# Patient Record
Sex: FEMALE | Race: ASIAN | Employment: UNEMPLOYED | URBAN - METROPOLITAN AREA
[De-identification: names, ages, dates, MRNs, and addresses within clinical notes are randomized per-mention and may not be internally consistent; named-entity substitution may affect disease eponyms.]

---

## 2023-10-30 ENCOUNTER — TELEPHONE (OUTPATIENT)
Dept: OBGYN CLINIC | Facility: CLINIC | Age: 28
End: 2023-10-30

## 2023-10-30 NOTE — TELEPHONE ENCOUNTER
Looking for new patient missed menses appointment, only wants to see Cheli Hartmann.  Unsure of LMP, but states 8 weeks PG

## 2023-10-31 NOTE — TELEPHONE ENCOUNTER
Pt called to clarify if she is wanting to see Tor Hercules through [facility] or Qamar Rosado through [address] wants future ultrasound and new ob visit with Tor Hercules canceled, and see Qamar Rosado for her missed menses.

## 2023-11-02 ENCOUNTER — INITIAL PRENATAL (OUTPATIENT)
Dept: OBGYN CLINIC | Facility: CLINIC | Age: 28
End: 2023-11-02

## 2023-11-02 ENCOUNTER — LAB ENCOUNTER (OUTPATIENT)
Dept: LAB | Age: 28
End: 2023-11-02
Attending: OBSTETRICS & GYNECOLOGY
Payer: MEDICAID

## 2023-11-02 VITALS — HEIGHT: 62.99 IN | BODY MASS INDEX: 21.38 KG/M2 | WEIGHT: 120.69 LBS

## 2023-11-02 DIAGNOSIS — Z11.3 SCREEN FOR STD (SEXUALLY TRANSMITTED DISEASE): ICD-10-CM

## 2023-11-02 DIAGNOSIS — Z34.01 ENCOUNTER FOR SUPERVISION OF NORMAL FIRST PREGNANCY IN FIRST TRIMESTER: ICD-10-CM

## 2023-11-02 DIAGNOSIS — N76.0 VAGINITIS AND VULVOVAGINITIS: ICD-10-CM

## 2023-11-02 DIAGNOSIS — Z12.4 ENCOUNTER FOR PAPANICOLAOU SMEAR FOR CERVICAL CANCER SCREENING: ICD-10-CM

## 2023-11-02 DIAGNOSIS — Z34.01 ENCOUNTER FOR SUPERVISION OF NORMAL FIRST PREGNANCY IN FIRST TRIMESTER: Primary | ICD-10-CM

## 2023-11-02 LAB
ANTIBODY SCREEN: NEGATIVE
BASOPHILS # BLD AUTO: 0.01 X10(3) UL (ref 0–0.2)
BASOPHILS NFR BLD AUTO: 0.2 %
DEPRECATED HBV CORE AB SER IA-ACNC: 70.4 NG/ML
EOSINOPHIL # BLD AUTO: 0.05 X10(3) UL (ref 0–0.7)
EOSINOPHIL NFR BLD AUTO: 1 %
ERYTHROCYTE [DISTWIDTH] IN BLOOD BY AUTOMATED COUNT: 13.9 %
HCT VFR BLD AUTO: 37 %
HGB BLD-MCNC: 11.7 G/DL
IMM GRANULOCYTES # BLD AUTO: 0.01 X10(3) UL (ref 0–1)
IMM GRANULOCYTES NFR BLD: 0.2 %
LYMPHOCYTES # BLD AUTO: 1.26 X10(3) UL (ref 1–4)
LYMPHOCYTES NFR BLD AUTO: 24.1 %
MCH RBC QN AUTO: 26.7 PG (ref 26–34)
MCHC RBC AUTO-ENTMCNC: 31.6 G/DL (ref 31–37)
MCV RBC AUTO: 84.3 FL
MONOCYTES # BLD AUTO: 0.39 X10(3) UL (ref 0.1–1)
MONOCYTES NFR BLD AUTO: 7.5 %
NEUTROPHILS # BLD AUTO: 3.51 X10 (3) UL (ref 1.5–7.7)
NEUTROPHILS # BLD AUTO: 3.51 X10(3) UL (ref 1.5–7.7)
NEUTROPHILS NFR BLD AUTO: 67 %
PLATELET # BLD AUTO: 183 10(3)UL (ref 150–450)
RBC # BLD AUTO: 4.39 X10(6)UL
RH BLOOD TYPE: POSITIVE
RUBV IGG SER QL: NEGATIVE
RUBV IGG SER-ACNC: 0.6 IU/ML (ref 10–?)
WBC # BLD AUTO: 5.2 X10(3) UL (ref 4–11)

## 2023-11-02 PROCEDURE — 87389 HIV-1 AG W/HIV-1&-2 AB AG IA: CPT

## 2023-11-02 PROCEDURE — 86850 RBC ANTIBODY SCREEN: CPT

## 2023-11-02 PROCEDURE — 86900 BLOOD TYPING SEROLOGIC ABO: CPT

## 2023-11-02 PROCEDURE — 82728 ASSAY OF FERRITIN: CPT

## 2023-11-02 PROCEDURE — 87591 N.GONORRHOEAE DNA AMP PROB: CPT

## 2023-11-02 PROCEDURE — 83036 HEMOGLOBIN GLYCOSYLATED A1C: CPT

## 2023-11-02 PROCEDURE — 87491 CHLMYD TRACH DNA AMP PROBE: CPT

## 2023-11-02 PROCEDURE — 87340 HEPATITIS B SURFACE AG IA: CPT

## 2023-11-02 PROCEDURE — 86901 BLOOD TYPING SEROLOGIC RH(D): CPT

## 2023-11-02 PROCEDURE — 3008F BODY MASS INDEX DOCD: CPT | Performed by: OBSTETRICS & GYNECOLOGY

## 2023-11-02 PROCEDURE — 0500F INITIAL PRENATAL CARE VISIT: CPT | Performed by: OBSTETRICS & GYNECOLOGY

## 2023-11-02 PROCEDURE — 86762 RUBELLA ANTIBODY: CPT

## 2023-11-02 PROCEDURE — 87086 URINE CULTURE/COLONY COUNT: CPT

## 2023-11-02 PROCEDURE — 85025 COMPLETE CBC W/AUTO DIFF WBC: CPT

## 2023-11-02 PROCEDURE — 86780 TREPONEMA PALLIDUM: CPT

## 2023-11-02 PROCEDURE — 86803 HEPATITIS C AB TEST: CPT

## 2023-11-02 RX ORDER — ASPIRIN 81 MG/1
81 TABLET ORAL 2 TIMES DAILY
Qty: 180 TABLET | Refills: 2 | Status: SHIPPED | OUTPATIENT
Start: 2023-11-02

## 2023-11-02 RX ORDER — IRON, FOLIC ACID, CYANOCOBALAMIN, ASCORBIC ACID, AND DOCUSATE SODIUM 90; 1; 12; 120; 50 MG/1; MG/1; UG/1; MG/1; MG/1
1 TABLET, FILM COATED ORAL EVERY OTHER DAY
Qty: 15 TABLET | Refills: 9 | Status: SHIPPED | OUTPATIENT
Start: 2023-11-02 | End: 2023-12-02

## 2023-11-02 RX ORDER — MULTIVIT-MIN/IRON/FOLIC ACID/K 18-600-40
1 CAPSULE ORAL DAILY
Qty: 90 CAPSULE | Refills: 3 | Status: SHIPPED | OUTPATIENT
Start: 2023-11-02

## 2023-11-02 NOTE — PROGRESS NOTES
Dennie Butters is a 34year old  female who presents for an initial OB exam.  Initial OB packet given and reviewed. LMP: 2023. Positive preg test:  10/03/2023    Periods:  Every 28-30 days. Previous pregnancy complications:  first pregnancy. Present compliants:  No concerns. Ultrasound done at [facility] - records in hand. Diet, exercise, activity restrictions, course of care, first trimester and genetic and cystic fibrosis screening reviewed. Cord blood banking reviewed.     Batsheva Dunaway MD

## 2023-11-03 LAB
C TRACH DNA SPEC QL NAA+PROBE: NEGATIVE
EST. AVERAGE GLUCOSE BLD GHB EST-MCNC: 103 MG/DL (ref 68–126)
HBA1C MFR BLD: 5.2 % (ref ?–5.7)
HBV SURFACE AG SER-ACNC: <0.1 [IU]/L
HBV SURFACE AG SERPL QL IA: NONREACTIVE
HCV AB SERPL QL IA: NONREACTIVE
N GONORRHOEA DNA SPEC QL NAA+PROBE: NEGATIVE
T PALLIDUM AB SER QL IA: NONREACTIVE

## 2023-11-07 ENCOUNTER — TELEPHONE (OUTPATIENT)
Dept: OBGYN CLINIC | Facility: CLINIC | Age: 28
End: 2023-11-07

## 2023-11-07 DIAGNOSIS — Z28.39 RUBELLA NON-IMMUNE STATUS, ANTEPARTUM: Primary | ICD-10-CM

## 2023-11-07 DIAGNOSIS — O09.899 RUBELLA NON-IMMUNE STATUS, ANTEPARTUM: Primary | ICD-10-CM

## 2023-11-07 RX ORDER — ASPIRIN 81 MG/1
81 TABLET ORAL 2 TIMES DAILY
Qty: 180 TABLET | Refills: 2 | Status: SHIPPED | OUTPATIENT
Start: 2023-11-07

## 2023-11-07 RX ORDER — METRONIDAZOLE 7.5 MG/G
GEL VAGINAL
Qty: 70 G | Refills: 0 | Status: SHIPPED | OUTPATIENT
Start: 2023-11-07

## 2023-11-07 RX ORDER — IRON, FOLIC ACID, CYANOCOBALAMIN, ASCORBIC ACID, AND DOCUSATE SODIUM 90; 1; 12; 120; 50 MG/1; MG/1; UG/1; MG/1; MG/1
1 TABLET, FILM COATED ORAL EVERY OTHER DAY
Qty: 15 TABLET | Refills: 9 | Status: SHIPPED | OUTPATIENT
Start: 2023-11-07 | End: 2023-12-07

## 2023-11-07 NOTE — TELEPHONE ENCOUNTER
----- Message from Mauricio Hewitt MD sent at 11/6/2023  8:23 PM CST -----  Patient has BV - treat with metrogel pv x 7 days preferred and a women's probiotic. Make changes in soap used in shower to an unscented, undyed version. If it is recurrent BV after the 7 nights of metrogel then continue once weekly for 15 weeks to allow the probiotics to work. If declines the metogel, can order flagyl 500mg po bid x 7 days (avoid alcohol with oral treatment).      aMuricio Hewitt MD

## 2023-11-07 NOTE — TELEPHONE ENCOUNTER
Pt Name and  verified. Pt needs scripts sent over to the [facility] pharmacy on file. Pt informed this has been sent over. Pt preferred to have metrogel and this was also sent over to the [facility] on file. No other questions.

## 2023-11-08 NOTE — TELEPHONE ENCOUNTER
Patient verified name and     States that the Rx was sent to [facility] and they are going to go pick it up. Verbalized understanding and agreed.

## 2023-11-09 LAB
.: NORMAL
.: NORMAL

## 2023-11-10 LAB — HPV I/H RISK 1 DNA SPEC QL NAA+PROBE: NEGATIVE

## 2023-11-21 ENCOUNTER — LAB ENCOUNTER (OUTPATIENT)
Dept: LAB | Age: 28
End: 2023-11-21
Attending: OBSTETRICS & GYNECOLOGY
Payer: MEDICAID

## 2023-11-21 DIAGNOSIS — Z34.01 ENCOUNTER FOR SUPERVISION OF NORMAL FIRST PREGNANCY IN FIRST TRIMESTER: ICD-10-CM

## 2023-11-21 LAB — GLUCOSE 1H P GLC SERPL-MCNC: 183 MG/DL

## 2023-11-21 PROCEDURE — 82950 GLUCOSE TEST: CPT

## 2023-11-29 DIAGNOSIS — O99.810 ABNORMAL MATERNAL GLUCOSE TOLERANCE, ANTEPARTUM: Primary | ICD-10-CM

## 2023-12-07 ENCOUNTER — ROUTINE PRENATAL (OUTPATIENT)
Dept: OBGYN CLINIC | Facility: CLINIC | Age: 28
End: 2023-12-07

## 2023-12-07 VITALS — SYSTOLIC BLOOD PRESSURE: 104 MMHG | WEIGHT: 123.38 LBS | DIASTOLIC BLOOD PRESSURE: 66 MMHG | BODY MASS INDEX: 22 KG/M2

## 2023-12-07 DIAGNOSIS — E61.1 IRON DEFICIENCY: ICD-10-CM

## 2023-12-07 DIAGNOSIS — O99.810 ABNORMAL MATERNAL GLUCOSE TOLERANCE, ANTEPARTUM: ICD-10-CM

## 2023-12-07 DIAGNOSIS — Z34.01 ENCOUNTER FOR SUPERVISION OF NORMAL FIRST PREGNANCY IN FIRST TRIMESTER: Primary | ICD-10-CM

## 2023-12-07 PROCEDURE — 3074F SYST BP LT 130 MM HG: CPT | Performed by: OBSTETRICS & GYNECOLOGY

## 2023-12-07 PROCEDURE — 3078F DIAST BP <80 MM HG: CPT | Performed by: OBSTETRICS & GYNECOLOGY

## 2023-12-07 PROCEDURE — 99213 OFFICE O/P EST LOW 20 MIN: CPT | Performed by: OBSTETRICS & GYNECOLOGY

## 2023-12-07 RX ORDER — LANCETS
EACH MISCELLANEOUS
Qty: 200 EACH | Refills: 2 | Status: SHIPPED | OUTPATIENT
Start: 2023-12-07

## 2023-12-07 RX ORDER — BLOOD SUGAR DIAGNOSTIC
STRIP MISCELLANEOUS
Qty: 200 STRIP | Refills: 3 | Status: SHIPPED | OUTPATIENT
Start: 2023-12-07 | End: 2023-12-07

## 2023-12-07 RX ORDER — GLUCOSAMINE HCL/CHONDROITIN SU 500-400 MG
CAPSULE ORAL
Qty: 120 EACH | Refills: 0 | Status: CANCELLED | OUTPATIENT
Start: 2023-12-07

## 2023-12-07 RX ORDER — BLOOD SUGAR DIAGNOSTIC
STRIP MISCELLANEOUS
Qty: 200 STRIP | Refills: 3 | Status: SHIPPED | OUTPATIENT
Start: 2023-12-07

## 2023-12-07 RX ORDER — DIPHENHYDRAMINE HYDROCHLORIDE 25 MG/1
CAPSULE, LIQUID FILLED ORAL
Qty: 1 KIT | Refills: 0 | Status: SHIPPED | OUTPATIENT
Start: 2023-12-07 | End: 2023-12-07

## 2023-12-07 RX ORDER — DIPHENHYDRAMINE HYDROCHLORIDE 25 MG/1
CAPSULE, LIQUID FILLED ORAL
Qty: 1 KIT | Refills: 0 | Status: SHIPPED | OUTPATIENT
Start: 2023-12-07

## 2023-12-07 RX ORDER — LANCETS
EACH MISCELLANEOUS
Qty: 200 EACH | Refills: 2 | Status: SHIPPED | OUTPATIENT
Start: 2023-12-07 | End: 2023-12-07

## 2023-12-07 RX ORDER — IRON, FOLIC ACID, CYANOCOBALAMIN, ASCORBIC ACID, AND DOCUSATE SODIUM 90; 1; 12; 120; 50 MG/1; MG/1; UG/1; MG/1; MG/1
1 TABLET, FILM COATED ORAL EVERY OTHER DAY
Qty: 15 TABLET | Refills: 9 | Status: SHIPPED | OUTPATIENT
Start: 2023-12-07 | End: 2024-01-06

## 2023-12-07 NOTE — PROGRESS NOTES
Labs reviewed with patient. Carrier and cfDNA screen done - will draw AFP at next visit.     Lab Results   Component Value Date    ABO BLOOD TYPE A 11/02/2023    RH BLOOD TYPE Positive 11/02/2023    HGB 11.7 (L) 11/02/2023    .0 11/02/2023    HCT 37.0 11/02/2023    Rubella IgG Negative (A) 11/02/2023    Treponemal Antibodies Nonreactive 11/02/2023    HIV Antigen Antibody Combo Non-Reactive 11/02/2023      Failed early 1h GTT - plan for QID accuchecks as n/v with 1 hr gtt

## 2023-12-26 ENCOUNTER — TELEPHONE (OUTPATIENT)
Dept: OBGYN CLINIC | Facility: CLINIC | Age: 28
End: 2023-12-26

## 2023-12-26 NOTE — TELEPHONE ENCOUNTER
Called patient to inform of genetic test results. Patient voiced she also viewed on [employer] portal. No questions.

## 2023-12-28 ENCOUNTER — ROUTINE PRENATAL (OUTPATIENT)
Dept: OBGYN CLINIC | Facility: CLINIC | Age: 28
End: 2023-12-28

## 2023-12-28 ENCOUNTER — LAB ENCOUNTER (OUTPATIENT)
Dept: LAB | Facility: HOSPITAL | Age: 28
End: 2023-12-28
Attending: OBSTETRICS & GYNECOLOGY
Payer: MEDICAID

## 2023-12-28 VITALS
WEIGHT: 126 LBS | SYSTOLIC BLOOD PRESSURE: 105 MMHG | BODY MASS INDEX: 22 KG/M2 | DIASTOLIC BLOOD PRESSURE: 72 MMHG | HEART RATE: 77 BPM

## 2023-12-28 DIAGNOSIS — Z34.02 ENCOUNTER FOR SUPERVISION OF NORMAL FIRST PREGNANCY IN SECOND TRIMESTER: ICD-10-CM

## 2023-12-28 DIAGNOSIS — Z34.02 ENCOUNTER FOR SUPERVISION OF NORMAL FIRST PREGNANCY IN SECOND TRIMESTER: Primary | ICD-10-CM

## 2023-12-28 PROCEDURE — 82105 ALPHA-FETOPROTEIN SERUM: CPT

## 2023-12-28 PROCEDURE — 3078F DIAST BP <80 MM HG: CPT | Performed by: OBSTETRICS & GYNECOLOGY

## 2023-12-28 PROCEDURE — 99212 OFFICE O/P EST SF 10 MIN: CPT | Performed by: OBSTETRICS & GYNECOLOGY

## 2023-12-28 PROCEDURE — 3074F SYST BP LT 130 MM HG: CPT | Performed by: OBSTETRICS & GYNECOLOGY

## 2023-12-28 PROCEDURE — 36415 COLL VENOUS BLD VENIPUNCTURE: CPT

## 2023-12-31 LAB
AFP MOM: 1.78
AFP VALUE: 66.9 NG/ML
GA ON COLL DATE: 15.9 WEEKS
INSULIN DEP AFP: NO
MAT AGE AT EDD: 26.6 YR
MULTIPLE GEST AFP: NO
OSBR RISK 1 IN AFP: 1341
WEIGHT AFP: 126 LBS

## 2024-01-29 ENCOUNTER — TELEPHONE (OUTPATIENT)
Dept: OBGYN CLINIC | Facility: CLINIC | Age: 29
End: 2024-01-29

## 2024-01-29 ENCOUNTER — HOSPITAL ENCOUNTER (OUTPATIENT)
Dept: ULTRASOUND IMAGING | Age: 29
Discharge: HOME | End: 2024-01-29
Attending: OBSTETRICS & GYNECOLOGY
Payer: MEDICAID

## 2024-01-29 DIAGNOSIS — O41.00X0 OLIGOHYDRAMNIOS, ANTEPARTUM, SINGLE OR UNSPECIFIED FETUS: Primary | ICD-10-CM

## 2024-01-29 DIAGNOSIS — Z34.02 ENCOUNTER FOR SUPERVISION OF NORMAL FIRST PREGNANCY IN SECOND TRIMESTER: ICD-10-CM

## 2024-01-29 PROCEDURE — 76817 TRANSVAGINAL US OBSTETRIC: CPT | Performed by: OBSTETRICS & GYNECOLOGY

## 2024-01-29 PROCEDURE — 76805 OB US >/= 14 WKS SNGL FETUS: CPT | Performed by: OBSTETRICS & GYNECOLOGY

## 2024-01-29 NOTE — TELEPHONE ENCOUNTER
Patient has questions regarding today's ultrasound results.  calling on her behalf. He is with the patient. Please call.

## 2024-01-29 NOTE — TELEPHONE ENCOUNTER
Dr. Arana reviewed pt's ultrasound results. Pt to repeat ultrasound through [facility] in 2 weeks. Pt called and informed of this information. Pt voices understanding. Pt has appointment to see TA tomorrow. 1/30/24

## 2024-01-30 ENCOUNTER — HOSPITAL ENCOUNTER (OUTPATIENT)
Dept: PERINATAL CARE | Facility: HOSPITAL | Age: 29
Discharge: HOME | End: 2024-01-30
Attending: OBSTETRICS & GYNECOLOGY
Payer: MEDICAID

## 2024-01-30 ENCOUNTER — ROUTINE PRENATAL (OUTPATIENT)
Dept: OBGYN CLINIC | Facility: CLINIC | Age: 29
End: 2024-01-30

## 2024-01-30 VITALS — BODY MASS INDEX: 23 KG/M2 | WEIGHT: 129 LBS

## 2024-01-30 VITALS — SYSTOLIC BLOOD PRESSURE: 120 MMHG | WEIGHT: 129.5 LBS | BODY MASS INDEX: 23 KG/M2 | DIASTOLIC BLOOD PRESSURE: 78 MMHG

## 2024-01-30 DIAGNOSIS — O28.3 ABNORMAL FETAL ULTRASOUND: ICD-10-CM

## 2024-01-30 DIAGNOSIS — Z36.89 ENCOUNTER FOR FETAL ANATOMIC SURVEY: ICD-10-CM

## 2024-01-30 DIAGNOSIS — O35.EXX0 RENAL AGENESIS OF FETUS AFFECTING ANTEPARTUM CARE OF MOTHER, SINGLE OR UNSPECIFIED FETUS: Primary | ICD-10-CM

## 2024-01-30 DIAGNOSIS — O99.810 ABNORMAL MATERNAL GLUCOSE TOLERANCE, ANTEPARTUM: Primary | ICD-10-CM

## 2024-01-30 DIAGNOSIS — O41.00X0 OLIGOHYDRAMNIOS, ANTEPARTUM, SINGLE OR UNSPECIFIED FETUS: ICD-10-CM

## 2024-01-30 PROCEDURE — 3078F DIAST BP <80 MM HG: CPT | Performed by: OBSTETRICS & GYNECOLOGY

## 2024-01-30 PROCEDURE — 99213 OFFICE O/P EST LOW 20 MIN: CPT | Performed by: OBSTETRICS & GYNECOLOGY

## 2024-01-30 PROCEDURE — 76811 OB US DETAILED SNGL FETUS: CPT | Performed by: OBSTETRICS & GYNECOLOGY

## 2024-01-30 PROCEDURE — 3074F SYST BP LT 130 MM HG: CPT | Performed by: OBSTETRICS & GYNECOLOGY

## 2024-01-30 NOTE — PROGRESS NOTES
Pt provided with phone number to M to schedule consultation/ repeat US in 2 weeks. Advised to call today and make appointment.    Per pt has been checking sugars.     No log entered    To see MFM today     ULTRASOUND ALY:  6/22/2024      ABNORMALITIES/OTHER:  None.  The report will be communicated to the appropriate referral recipient, by the [facility]  Radiology Communicator Specialist.                   Impression   CONCLUSION:       1. There is OLIGOHYDRAMNIOS.  Visibly, very small amount of amniotic fluid, NATALIIA only 3.2.     2. Concern for intrauterine growth retardation, estimated fetal weight below the 3rd percentile, and additionally there is abnormal cephalic index comma and abnormal HC/AC ratio, as listed above.     3. Fetal movement identified, but limited by the lack of amniotic fluid.     4. Fetal heart rate 142 BPM.     5. Some fetal structures could not be well visualized because of lack of amniotic fluid and fetal position.

## 2024-01-30 NOTE — PROGRESS NOTES
Outpatient Maternal-Fetal Medicine Consultation    Dear Dr. Arana    Thank you for requesting ultrasound evaluation and maternal fetal medicine consultation on your patient Abelino Rico.  As you are aware she is a 28 year old female  with a singletonpregnancy and an Estimated Date of Delivery: 24.  A maternal-fetal medicine consultation was requested secondary to  suspicion for IUGR and oligohydramnios on outside scan .  Her prenatal records and labs were reviewed.    HISTORY  # 1 - Date: None, Sex: None, Weight: None, GA: None, Delivery: None, Apgar1: None, Apgar5: None, Living: None, Birth Comments: None      Past Medical History  The patient  has no past medical history on file.    Past Surgical History  The patient  has no past surgical history on file.    Family History  The patient has no family status information on file.       Medications:   Current Outpatient Medications:     Blood Glucose Monitoring Suppl (BLOOD GLUCOSE MONITOR SYSTEM) w/Device Does not apply Kit, Abelino Rico, Disp: 1 kit, Rfl: 0    Glucose Blood (ONETOUCH ULTRA) In Vitro Strip, Use strip to test blood glucose 4 times a day as directed., Disp: 200 strip, Rfl: 3    Microlet Lancets Does not apply Misc, Use 1 lancet 4 times daily. Please substitute with preferred meter on patient insurance formulary, Disp: 200 each, Rfl: 2    aspirin (ASPIRIN 81) 81 MG Oral Tab EC, Take 1 tablet (81 mg total) by mouth in the morning and 1 tablet (81 mg total) before bedtime., Disp: 180 tablet, Rfl: 2    metroNIDAZOLE 0.75 % Vaginal Gel, Apply vaginally for 7 nights., Disp: 70 g, Rfl: 0    Prenatal MV-Min-Fe Fum-FA-DHA (PRENATAL/FOLIC ACID+DHA) 27-0.8-200 MG Oral Cap, Take 1 tablet by mouth daily., Disp: 90 capsule, Rfl: 3  Allergies: No Known Allergies    PHYSICAL EXAMINATION:  Wt 129 lb (58.5 kg)   LMP 2023   BMI 22.86 kg/m²   General: alert and oriented,no acute distress  Abdomen: gravid, soft, non-tender  Extremities:  non-tender, no edema    OBSTETRIC ULTRASOUND  The patient had a level II ultrasound today which revealed size consistent with dates and a normal detailed anatomic survey.      Ultrasound Findings:  Single IUP in cephalic presentation.    Placenta is anterior, high.   A 3 vessel cord is noted.  Cardiac activity is present at 150 bpm   g ( 0 lb 11 oz);   Anhydramnios  MVP is 0.7 cm . NATALIIA 1.2 cm  The following structures appear abnormal:  Head / Neck  Right lateral ventricle: ventriculomegaly 13 mm. Left lateral ventricle: ventriculomegaly 10.4 mm. Right choroid plexus: dangling choroid sign. Left choroid plexus: dangling choroid sign. Cerebellum: small.   Heart / Thorax Cardiac size: Ventricles appear thickened and heart enlarged, small chest/ lung area. Left superior vena cava, dilated coronary artery   Abdomen : Umbilical vein coursing anterior aspect of the liver, Kidneys: suspected bilateral renal agenesis.   Spine: Sacral spine: suspected sacral agenesis.     The following structures could not be adequately visualized:  Spine Lumbar spine.   Extremities / Skeleton  Arms. Legs.    See PACS/Imaging Tab For Complete Ultrasound Report  I interpreted the results and reviewed them with the patient.    DISCUSSION  During her visit we discussed and reviewed the following issues:  POTTER SYNDROME  Potter syndrome is described in infants or fetuses with severe autosomal recessive polycystic kidney disease (ARPKD) who often present with Potter syndrome, a composite of findings associated with a fetus or  with severe oligohydramnios.  This syndrome includes the following:  Positional limb deformities (eg, club feet and hip dislocation)  Typical facial appearance of pseudoepicanthus, recessed chin, posteriorly rotated, flattened ears, and flattened nose  Pulmonary hypoplasia    SUSPECT RENAL AGENESIS  Kidneys were not identified and anhydramnios is noted.  In addition the fetal thorax is markedly decreased in  size suggestive of longstanding anhydramnios and high risk for pulmonary hypoplasia.  I reviewed these findings with the patient and her spouse at the bedside today and I am suspicious that regardless of the duration of the pregnancy if the pregnancy was to continue would likely be associated with lethal pulmonary hypoplasia.  In light of these concerns I offered a second opinion ultrasound and the possibility of facilitating pregnancy termination.  The patient will be referred to [facility] for further evaluation.      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 Arana  Referral to [facility] for second opinion ultrasound and possible pregnancy termination    Thank you for allowing me to participate in the care of your patient.  Please do not hesitate to contact me if additional questions or concerns arise.      Krish Torres M.D.    55 minutes spent in review of records, patient consultation, documentation and coordination of care.  The relevant clinical matter(s) are summarized above.     Note to patient and family  The 21st Century Cures Act makes medical notes available to patients in the interest of transparency.  However, please be advised that this is a medical document.  It is intended as [id number] communication.  It is written and medical language may contain abbreviations or verbiage that are technical and unfamiliar.  It may appear blunt or direct.  Medical documents are intended to carry relevant information, facts as evident, and the clinical opinion of the practitioner.

## 2024-02-01 ENCOUNTER — TELEPHONE (OUTPATIENT)
Dept: OBGYN CLINIC | Facility: CLINIC | Age: 29
End: 2024-02-01

## 2024-02-01 NOTE — TELEPHONE ENCOUNTER
Spoke with Mercedes, reports pt is to schedule MRI with [facility], she has sent a referral and spoke with them to call patient to get appointment. Called patient and spoke with spouse and informed he will get a call to make apt with [facility] for MRI

## 2024-02-01 NOTE — TELEPHONE ENCOUNTER
Spoke with FOB Guy - requesting MFM order mri and if renal agenesis then proceed with D&E .    Bisi Arana MD

## 2024-02-08 ENCOUNTER — TELEPHONE (OUTPATIENT)
Dept: OBGYN CLINIC | Facility: CLINIC | Age: 29
End: 2024-02-08

## 2024-02-08 NOTE — TELEPHONE ENCOUNTER
Called and spoke with  regarding patient's upcoming procedure. Per  Magda's referred them to Amanda. Procedure has been scheduled 02/15/2024. No further concerns

## 2024-02-19 ENCOUNTER — TELEPHONE (OUTPATIENT)
Dept: OBGYN CLINIC | Facility: CLINIC | Age: 29
End: 2024-02-19

## 2024-02-19 NOTE — TELEPHONE ENCOUNTER
Pt had termination of pregnancy on Friday , Pt breast are in pain. Asking if any medication she can get

## 2024-02-19 NOTE — TELEPHONE ENCOUNTER
Patient verified name and     Patient had termination on Friday, patient feels like her breast are enlarging. Patient states she had been feeling soreness on breast. Has been taking Tylenol PRN with relief. Wanted to know if there's an Rx for the enlargement. Discussed she can continue Tylenol PRN, we would also recommend cold compress of cabbage leaves for engorgement. Verbalized understanding and agreed. Will call us back if symptoms are worsening or unrelieved.

## 2024-02-26 ENCOUNTER — TELEPHONE (OUTPATIENT)
Dept: OBGYN CLINIC | Facility: CLINIC | Age: 29
End: 2024-02-26

## 2024-02-26 NOTE — TELEPHONE ENCOUNTER
Patient verified name and     Discussed vaccine recommendations, patient requesting f/u appt after D&C. Scheduled for 3/7 with TA. Aware of scheduling details.

## 2024-02-26 NOTE — TELEPHONE ENCOUNTER
Spouse calling to discuss the followin)  when should pt take a vaccine for Rubella  2)  when can pt have a f/u appt

## 2024-03-07 ENCOUNTER — ROUTINE PRENATAL (OUTPATIENT)
Dept: OBGYN CLINIC | Facility: CLINIC | Age: 29
End: 2024-03-07

## 2024-03-07 VITALS — SYSTOLIC BLOOD PRESSURE: 98 MMHG | DIASTOLIC BLOOD PRESSURE: 62 MMHG | BODY MASS INDEX: 23 KG/M2 | WEIGHT: 129.81 LBS

## 2024-03-07 DIAGNOSIS — O99.810 ABNORMAL MATERNAL GLUCOSE TOLERANCE, ANTEPARTUM (HCC): ICD-10-CM

## 2024-03-07 DIAGNOSIS — Z28.39 RUBELLA NON-IMMUNE STATUS, ANTEPARTUM (HCC): ICD-10-CM

## 2024-03-07 DIAGNOSIS — O09.899 RUBELLA NON-IMMUNE STATUS, ANTEPARTUM (HCC): ICD-10-CM

## 2024-03-07 DIAGNOSIS — O35.EXX0: ICD-10-CM

## 2024-03-07 NOTE — PROGRESS NOTES
Chief Complaint   Patient presents with    Follow - Up         Visit Type:  Post-operative follow-up    Date of Procedure:  02/15/2024    Procedure:  Dilation and Evacuation under US Guidance     Indications for Procedure:  Suspected Fetal Abnormality affecting management of mother    Pathology Report:      Surgery/Post-op Complications: No    Pain:  No    Medications pertaining to Surgery:  no    Incision (if applicable):  n/a    Diet: normal    Voiding:  normal    Bowel movements/Flatus:  normal    Activity: normal    Problems:  pt would like to discuss questions about future pregnancies.    BP 98/62   Wt 129 lb 12.9 oz (58.9 kg)   LMP 08/30/2023   Wt Readings from Last 3 Encounters:   03/07/24 129 lb 12.9 oz (58.9 kg)   01/30/24 129 lb (58.5 kg)   01/30/24 129 lb 8.3 oz (58.7 kg)     Health Maintenance   Topic Date Due    Annual Physical  Never done    HPV Vaccines (1 - 3-dose series) Never done    DTaP,Tdap,and Td Vaccines (1 - Tdap) Never done    COVID-19 Vaccine (1 - 2023-24 season) Never done    Influenza Vaccine (1) Never done    Annual Depression Screening  Never done    Pap Smear  11/02/2026    Pneumococcal Vaccine: Birth to 64yrs  Aged Out         Review of Systems   General: Present- Feeling well.  Cardiovascular: Not Present- Chest Pain, Elevated Blood Pressure, Leg Pain and/or Swelling and Shortness of Breath.  Gastrointestinal: Not Present- Nausea and Vomiting.  Female Genitourinary: Not Present- Discharge, Dysmenorrhea, Dysuria, Excessive Menstrual Bleeding and Pelvic Pain.  Musculoskeletal: Not Present- Leg Cramps and Swelling of Extremities.  Neurological: Not Present- Headaches.  Psychiatric: Not Present- Anxiety and Depression.  All other systems negative       Physical Exam   The physical exam findings are as follows:       General   Mental Status - Alert. General Appearance - Well Developed/Well Nourished/No acute distress/ NC/AT.      Note: More than 50% of this visit was spent in counseling  or coordinating care for the following reason postpartum following D&E for fetal abn - renal agenesis, FGR, pulmonary hypoplasia.  .      Discussed Interval between DNA and next pregnancy.  Patient to have lab work done as abnormal 1 hour GTT was noted.  Patient staying on her prenatal vitamin.  Doing well has good social support declines any therapy.    Labs:  A. Uterine contents (placenta), dilation and evacuation:  Fragmented placental tissue, 127 grams in aggregate.  Membranes and three-vessel umbilical cord without pathologic abnormality.  Accelerated villous maturation and villous edema.    1. Encounter for postpartum visit (HCC)    2. Renal agenesis of fetus affecting antepartum care of mother, single or unspecified fetus (HCC)    3. Rubella non-immune status, antepartum ([facility])    4. Abnormal maternal glucose tolerance, antepartum ([facility])

## 2024-03-08 ENCOUNTER — LAB ENCOUNTER (OUTPATIENT)
Dept: LAB | Age: 29
End: 2024-03-08
Attending: OBSTETRICS & GYNECOLOGY
Payer: MEDICAID

## 2024-03-08 DIAGNOSIS — O99.810 ABNORMAL MATERNAL GLUCOSE TOLERANCE, ANTEPARTUM (HCC): ICD-10-CM

## 2024-03-08 LAB
BASOPHILS # BLD AUTO: 0.01 X10(3) UL (ref 0–0.2)
BASOPHILS NFR BLD AUTO: 0.3 %
DEPRECATED HBV CORE AB SER IA-ACNC: 34.6 NG/ML
EOSINOPHIL # BLD AUTO: 0.13 X10(3) UL (ref 0–0.7)
EOSINOPHIL NFR BLD AUTO: 4 %
ERYTHROCYTE [DISTWIDTH] IN BLOOD BY AUTOMATED COUNT: 12.2 %
GLUCOSE 1H P GLC SERPL-MCNC: 168 MG/DL
HCT VFR BLD AUTO: 38.3 %
HGB BLD-MCNC: 12 G/DL
IMM GRANULOCYTES # BLD AUTO: 0 X10(3) UL (ref 0–1)
IMM GRANULOCYTES NFR BLD: 0 %
LYMPHOCYTES # BLD AUTO: 1.71 X10(3) UL (ref 1–4)
LYMPHOCYTES NFR BLD AUTO: 52.6 %
MCH RBC QN AUTO: 27.8 PG (ref 26–34)
MCHC RBC AUTO-ENTMCNC: 31.3 G/DL (ref 31–37)
MCV RBC AUTO: 88.9 FL
MONOCYTES # BLD AUTO: 0.26 X10(3) UL (ref 0.1–1)
MONOCYTES NFR BLD AUTO: 8 %
NEUTROPHILS # BLD AUTO: 1.14 X10 (3) UL (ref 1.5–7.7)
NEUTROPHILS # BLD AUTO: 1.14 X10(3) UL (ref 1.5–7.7)
NEUTROPHILS NFR BLD AUTO: 35.1 %
PLATELET # BLD AUTO: 161 10(3)UL (ref 150–450)
RBC # BLD AUTO: 4.31 X10(6)UL
WBC # BLD AUTO: 3.3 X10(3) UL (ref 4–11)

## 2024-03-08 PROCEDURE — 82950 GLUCOSE TEST: CPT

## 2024-03-08 PROCEDURE — 82728 ASSAY OF FERRITIN: CPT

## 2024-03-08 PROCEDURE — 85025 COMPLETE CBC W/AUTO DIFF WBC: CPT

## 2024-04-04 ENCOUNTER — TELEPHONE (OUTPATIENT)
Dept: OBGYN CLINIC | Facility: CLINIC | Age: 29
End: 2024-04-04

## 2024-04-04 NOTE — TELEPHONE ENCOUNTER
Pt had D&E on 2/16/24 . Pt had her first menses 8 days ago and had moderate uterine cramping with that menses. Pt reports her menstrual flow was normal. Pt took tylenol with good relief. Bleeding has stopped and pt no longer cramping. Advised pt that the cramping can be normal with the first menses after having a D&E. Reviewed s/s of infection with pt. Pt voices understanding.Offered pt appointment,but pt refused and would like to just monitor at home for now.

## 2024-04-29 ENCOUNTER — LAB ENCOUNTER (OUTPATIENT)
Dept: LAB | Age: 29
End: 2024-04-29
Attending: OBSTETRICS & GYNECOLOGY
Payer: MEDICAID

## 2024-04-29 ENCOUNTER — TELEPHONE (OUTPATIENT)
Dept: OBGYN CLINIC | Facility: CLINIC | Age: 29
End: 2024-04-29

## 2024-04-29 DIAGNOSIS — N92.6 MISSED MENSES: ICD-10-CM

## 2024-04-29 DIAGNOSIS — N92.6 MISSED MENSES: Primary | ICD-10-CM

## 2024-04-29 LAB
B-HCG SERPL-ACNC: 41 MIU/ML
PROGEST SERPL-MCNC: 11.6 NG/ML

## 2024-04-29 PROCEDURE — 84702 CHORIONIC GONADOTROPIN TEST: CPT

## 2024-04-29 PROCEDURE — 84144 ASSAY OF PROGESTERONE: CPT

## 2024-04-29 NOTE — TELEPHONE ENCOUNTER
Patient verified name and     LMP 3/24/24  Hx fetal demise.  Discussed serial hcg. Patient to repeat in 48 hr. If appropriate, patient to be scheduled for RN education. Verbalized understanding and agreed. Orders placed.

## 2024-05-02 ENCOUNTER — LAB ENCOUNTER (OUTPATIENT)
Dept: LAB | Age: 29
End: 2024-05-02
Attending: OBSTETRICS & GYNECOLOGY
Payer: MEDICAID

## 2024-05-02 DIAGNOSIS — N92.6 MISSED MENSES: ICD-10-CM

## 2024-05-02 LAB
B-HCG SERPL-ACNC: 74 MIU/ML
PROGEST SERPL-MCNC: 4.94 NG/ML

## 2024-05-02 PROCEDURE — 84144 ASSAY OF PROGESTERONE: CPT

## 2024-05-02 PROCEDURE — 84702 CHORIONIC GONADOTROPIN TEST: CPT

## 2024-05-03 RX ORDER — PROGESTERONE 200 MG/1
200 CAPSULE ORAL AS DIRECTED
Qty: 12 CAPSULE | Refills: 0 | Status: SHIPPED | OUTPATIENT
Start: 2024-05-03 | End: 2024-05-03

## 2024-05-03 RX ORDER — PROGESTERONE 200 MG/1
200 CAPSULE ORAL AS DIRECTED
Qty: 30 CAPSULE | Refills: 1 | Status: SHIPPED | OUTPATIENT
Start: 2024-05-03

## 2024-05-06 ENCOUNTER — TELEPHONE (OUTPATIENT)
Dept: OBGYN CLINIC | Facility: CLINIC | Age: 29
End: 2024-05-06

## 2024-05-06 ENCOUNTER — OFFICE VISIT (OUTPATIENT)
Dept: OBGYN CLINIC | Facility: CLINIC | Age: 29
End: 2024-05-06

## 2024-05-06 ENCOUNTER — LAB ENCOUNTER (OUTPATIENT)
Dept: LAB | Facility: HOSPITAL | Age: 29
End: 2024-05-06
Attending: OBSTETRICS & GYNECOLOGY
Payer: MEDICAID

## 2024-05-06 VITALS
HEIGHT: 62.99 IN | DIASTOLIC BLOOD PRESSURE: 72 MMHG | SYSTOLIC BLOOD PRESSURE: 106 MMHG | WEIGHT: 129 LBS | BODY MASS INDEX: 22.86 KG/M2

## 2024-05-06 DIAGNOSIS — O03.9 SAB (SPONTANEOUS ABORTION) (HCC): Primary | ICD-10-CM

## 2024-05-06 DIAGNOSIS — N92.6 MISSED MENSES: Primary | ICD-10-CM

## 2024-05-06 DIAGNOSIS — N92.6 MISSED MENSES: ICD-10-CM

## 2024-05-06 LAB
B-HCG SERPL-ACNC: 24.7 MIU/ML
PROGEST SERPL-MCNC: 8.6 NG/ML

## 2024-05-06 PROCEDURE — 84144 ASSAY OF PROGESTERONE: CPT

## 2024-05-06 PROCEDURE — 84702 CHORIONIC GONADOTROPIN TEST: CPT

## 2024-05-06 PROCEDURE — 36415 COLL VENOUS BLD VENIPUNCTURE: CPT

## 2024-05-06 PROCEDURE — 99213 OFFICE O/P EST LOW 20 MIN: CPT | Performed by: OBSTETRICS & GYNECOLOGY

## 2024-05-06 NOTE — TELEPHONE ENCOUNTER
Patient has been bleeding since 5/06. Scheduled appointment for today at 3:45. Please call to confirm that was correct course of action.

## 2024-05-06 NOTE — PROGRESS NOTES
Chief Complaint   Patient presents with    Follow - Up         Abelino Rico is a 28 year old female who presents for follow up HCG.    Passing blood clots, cramping.        There is no immunization history on file for this patient.      Current Outpatient Medications:     progesterone 200 MG Oral Cap, Take 1 capsule (200 mg total) by mouth As Directed., Disp: 30 capsule, Rfl: 1    Blood Glucose Monitoring Suppl (BLOOD GLUCOSE MONITOR SYSTEM) w/Device Does not apply Kit, Abelino Rico (Patient not taking: Reported on 3/7/2024), Disp: 1 kit, Rfl: 0    Glucose Blood (ONETOUCH ULTRA) In Vitro Strip, Use strip to test blood glucose 4 times a day as directed. (Patient not taking: Reported on 3/7/2024), Disp: 200 strip, Rfl: 3    Microlet Lancets Does not apply Misc, Use 1 lancet 4 times daily. Please substitute with preferred meter on patient insurance formulary (Patient not taking: Reported on 3/7/2024), Disp: 200 each, Rfl: 2    aspirin (ASPIRIN 81) 81 MG Oral Tab EC, Take 1 tablet (81 mg total) by mouth in the morning and 1 tablet (81 mg total) before bedtime. (Patient not taking: Reported on 3/7/2024), Disp: 180 tablet, Rfl: 2    metroNIDAZOLE 0.75 % Vaginal Gel, Apply vaginally for 7 nights. (Patient not taking: Reported on 3/7/2024), Disp: 70 g, Rfl: 0    Prenatal MV-Min-Fe Fum-FA-DHA (PRENATAL/FOLIC ACID+DHA) 27-0.8-200 MG Oral Cap, Take 1 tablet by mouth daily., Disp: 90 capsule, Rfl: 3    No Known Allergies    OB History    Para Term  AB Living   2 0 0 0 1 0   SAB IAB Ectopic Multiple Live Births   0 1 0 0 0      # Outcome Date GA Lbr Chance/2nd Weight Sex Type Anes PTL Lv   2 Current            1 IAB 24 23w0d    THERAPEUTIC Gen  FD       No past medical history on file.    No past surgical history on file.    No family history on file.     Tobacco  Soc Hx        Social History     Socioeconomic History    Marital status:      Spouse name: Not on file    Number of children: Not  on file    Years of education: Not on file    Highest education level: Not on file   Occupational History    Not on file   Tobacco Use    Smoking status: Never     Passive exposure: Never    Smokeless tobacco: Never   Vaping Use    Vaping status: Never Used   Substance and Sexual Activity    Alcohol use: Not Currently    Drug use: Never    Sexual activity: Yes     Partners: Male   Other Topics Concern    Not on file   Social History Narrative    Not on file     Social Determinants of Health     Financial Resource Strain: Not on file   Food Insecurity: Not on file   Transportation Needs: Not on file   Stress: Not on file   Housing Stability: Not on file     /72   Ht 5' 2.99\" (1.6 m)   Wt 129 lb (58.5 kg)   LMP 03/24/2024   Wt Readings from Last 3 Encounters:   05/06/24 129 lb (58.5 kg)   03/07/24 129 lb 12.9 oz (58.9 kg)   01/30/24 129 lb (58.5 kg)     Health Maintenance   Topic Date Due    Annual Physical  Never done    HPV Vaccines (1 - 3-dose series) Never done    DTaP,Tdap,and Td Vaccines (1 - Tdap) Never done    COVID-19 Vaccine (1 - 2023-24 season) Never done    Annual Depression Screening  Never done    Influenza Vaccine (Season Ended) 10/01/2024    Pap Smear  11/02/2026    Pneumococcal Vaccine: Birth to 64yrs  Aged Out         Review of Systems   General: Present- Feeling well.  Cardiovascular: Not Present- Chest Pain, Elevated Blood Pressure, Leg Pain and/or Swelling and Shortness of Breath.  Gastrointestinal: Not Present- Nausea and Vomiting.  Female Genitourinary: Not Present- Discharge, Dysmenorrhea, Dysuria, Excessive Menstrual Bleeding and Pelvic Pain.  Musculoskeletal: Not Present- Leg Cramps and Swelling of Extremities.  Neurological: Not Present- Headaches.  Psychiatric: Not Present- Anxiety and Depression.  All other systems negative       Physical Exam   The physical exam findings are as follows:       General   Mental Status - Alert. General Appearance - Well Developed/Well Nourished/No  acute distress/ NC/AT.      Note: More than 50% of this visit was spent in counseling or coordinating care for the following reason SAB recurrent - plan HCG in 1 week and awaiting genetic w/u from last SAB .        1. SAB (spontaneous ) (HCC)

## 2024-05-06 NOTE — TELEPHONE ENCOUNTER
Pt Name and  verified.  Pt states that she has started on progesterone. Began to have spotting pink in color on Saturday. States that she has some period cramping. Pt states that the bleeding today she noticed some blood clots in the bathrooms. Clots are about the size of a jayna. Pt has apt today with TA in office. Bleeding precautions reviewed with pt and advised to go to ED if bleeding/cramping increase. Pt advised to complete HCG and progesterone per Dr. Arana's note on recent HCG result. Pt voiced understanding.

## 2024-05-06 NOTE — TELEPHONE ENCOUNTER
Medication Quantity Refills Start End   progesterone 200 MG Oral Cap 30 capsule 1 5/3/2024 --   Sig:   Take 1 capsule (200 mg total) by mouth As Directed.     Route:   Oral     Order #:   [id number]

## 2024-05-21 ENCOUNTER — LAB ENCOUNTER (OUTPATIENT)
Dept: LAB | Age: 29
End: 2024-05-21
Attending: OBSTETRICS & GYNECOLOGY

## 2024-05-21 DIAGNOSIS — O03.9 SAB (SPONTANEOUS ABORTION) (HCC): ICD-10-CM

## 2024-05-21 LAB — B-HCG SERPL-ACNC: <1 MIU/ML

## 2024-05-21 PROCEDURE — 84702 CHORIONIC GONADOTROPIN TEST: CPT

## 2024-06-04 ENCOUNTER — TELEPHONE (OUTPATIENT)
Dept: PERINATAL CARE | Facility: HOSPITAL | Age: 29
End: 2024-06-04

## 2024-06-04 ENCOUNTER — TELEPHONE (OUTPATIENT)
Dept: OBGYN UNIT | Facility: HOSPITAL | Age: 29
End: 2024-06-04

## 2024-07-05 ENCOUNTER — TELEPHONE (OUTPATIENT)
Dept: OBGYN CLINIC | Facility: CLINIC | Age: 29
End: 2024-07-05

## 2024-07-05 DIAGNOSIS — N92.6 MISSED MENSES: Primary | ICD-10-CM

## 2024-07-05 NOTE — TELEPHONE ENCOUNTER
Name and Date of Birth verified    Patients menses was due on 4th of July. She believes she may have had another miscarriage on July 3rd. Patient desires HCG level to see if pregnancy or menses.     HCG level placed for patient. Denies heavy bleeding.      Patient states she and  were supposed to have lab ordered placed due to multiple miscarriages.   She states this is not a referral to a genetic specialist.       Forward to provider to confirm what labs to be ordered.

## 2024-07-16 ENCOUNTER — LAB ENCOUNTER (OUTPATIENT)
Dept: LAB | Facility: HOSPITAL | Age: 29
End: 2024-07-16
Attending: OBSTETRICS & GYNECOLOGY
Payer: MEDICAID

## 2024-07-16 DIAGNOSIS — Z87.59 HISTORY OF MISCARRIAGE: ICD-10-CM

## 2024-07-16 PROCEDURE — 88237 TISSUE CULTURE BONE MARROW: CPT

## 2024-07-16 PROCEDURE — 36415 COLL VENOUS BLD VENIPUNCTURE: CPT

## 2024-07-16 PROCEDURE — 88262 CHROMOSOME ANALYSIS 15-20: CPT

## 2024-08-06 LAB
CELLS ANALYZED BLDCHR: 20
CELLS COUNTED BLDCHR: 20
CELLS KARYOTYPED BLDCHR: 2
GTG BAND BLDCHR: 500

## 2024-08-07 ENCOUNTER — LAB ENCOUNTER (OUTPATIENT)
Dept: LAB | Facility: HOSPITAL | Age: 29
End: 2024-08-07
Attending: OBSTETRICS & GYNECOLOGY
Payer: MEDICAID

## 2024-08-07 DIAGNOSIS — N92.6 MISSED MENSES: ICD-10-CM

## 2024-08-07 LAB — B-HCG SERPL-ACNC: <2.6 MIU/ML

## 2024-08-07 PROCEDURE — 36415 COLL VENOUS BLD VENIPUNCTURE: CPT

## 2024-08-07 PROCEDURE — 84702 CHORIONIC GONADOTROPIN TEST: CPT

## 2024-12-06 ENCOUNTER — HOSPITAL ENCOUNTER (EMERGENCY)
Facility: HOSPITAL | Age: 29
Discharge: HOME | End: 2024-12-06
Attending: EMERGENCY MEDICINE
Payer: MEDICAID

## 2024-12-06 VITALS
HEIGHT: 66 IN | OXYGEN SATURATION: 97 % | DIASTOLIC BLOOD PRESSURE: 66 MMHG | TEMPERATURE: 99 F | HEART RATE: 98 BPM | SYSTOLIC BLOOD PRESSURE: 107 MMHG | BODY MASS INDEX: 22.32 KG/M2 | RESPIRATION RATE: 18 BRPM | WEIGHT: 138.88 LBS

## 2024-12-06 DIAGNOSIS — K52.9 GASTROENTERITIS: Primary | ICD-10-CM

## 2024-12-06 LAB
ALBUMIN SERPL-MCNC: 4.5 G/DL (ref 3.2–4.8)
ALBUMIN/GLOB SERPL: 1.5 {RATIO} (ref 1–2)
ALP LIVER SERPL-CCNC: 45 U/L
ALT SERPL-CCNC: 19 U/L
ANION GAP SERPL CALC-SCNC: 6 MMOL/L (ref 0–18)
AST SERPL-CCNC: 22 U/L (ref ?–34)
BASOPHILS # BLD AUTO: 0 X10(3) UL (ref 0–0.2)
BASOPHILS NFR BLD AUTO: 0 %
BILIRUB SERPL-MCNC: 0.6 MG/DL (ref 0.3–1.2)
BUN BLD-MCNC: 13 MG/DL (ref 9–23)
CALCIUM BLD-MCNC: 9.2 MG/DL (ref 8.7–10.4)
CHLORIDE SERPL-SCNC: 109 MMOL/L (ref 98–112)
CO2 SERPL-SCNC: 22 MMOL/L (ref 21–32)
CREAT BLD-MCNC: 0.68 MG/DL
EGFRCR SERPLBLD CKD-EPI 2021: 122 ML/MIN/1.73M2 (ref 60–?)
EOSINOPHIL # BLD AUTO: 0.02 X10(3) UL (ref 0–0.7)
EOSINOPHIL NFR BLD AUTO: 0.2 %
ERYTHROCYTE [DISTWIDTH] IN BLOOD BY AUTOMATED COUNT: 13.3 %
GLOBULIN PLAS-MCNC: 3 G/DL (ref 2–3.5)
GLUCOSE BLD-MCNC: 100 MG/DL (ref 70–99)
HCT VFR BLD AUTO: 42 %
HGB BLD-MCNC: 13.3 G/DL
IMM GRANULOCYTES # BLD AUTO: 0.01 X10(3) UL (ref 0–1)
IMM GRANULOCYTES NFR BLD: 0.1 %
LIPASE SERPL-CCNC: 61 U/L (ref 12–53)
LYMPHOCYTES # BLD AUTO: 0.31 X10(3) UL (ref 1–4)
LYMPHOCYTES NFR BLD AUTO: 3.4 %
MCH RBC QN AUTO: 27.1 PG (ref 26–34)
MCHC RBC AUTO-ENTMCNC: 31.7 G/DL (ref 31–37)
MCV RBC AUTO: 85.7 FL
MONOCYTES # BLD AUTO: 0.32 X10(3) UL (ref 0.1–1)
MONOCYTES NFR BLD AUTO: 3.5 %
NEUTROPHILS # BLD AUTO: 8.41 X10 (3) UL (ref 1.5–7.7)
NEUTROPHILS # BLD AUTO: 8.41 X10(3) UL (ref 1.5–7.7)
NEUTROPHILS NFR BLD AUTO: 92.8 %
OSMOLALITY SERPL CALC.SUM OF ELEC: 284 MOSM/KG (ref 275–295)
PLATELET # BLD AUTO: 154 10(3)UL (ref 150–450)
POTASSIUM SERPL-SCNC: 4 MMOL/L (ref 3.5–5.1)
PROT SERPL-MCNC: 7.5 G/DL (ref 5.7–8.2)
RBC # BLD AUTO: 4.9 X10(6)UL
SODIUM SERPL-SCNC: 137 MMOL/L (ref 136–145)
WBC # BLD AUTO: 9.1 X10(3) UL (ref 4–11)

## 2024-12-06 PROCEDURE — 85025 COMPLETE CBC W/AUTO DIFF WBC: CPT

## 2024-12-06 PROCEDURE — 99284 EMERGENCY DEPT VISIT MOD MDM: CPT

## 2024-12-06 PROCEDURE — 96375 TX/PRO/DX INJ NEW DRUG ADDON: CPT

## 2024-12-06 PROCEDURE — 85025 COMPLETE CBC W/AUTO DIFF WBC: CPT | Performed by: EMERGENCY MEDICINE

## 2024-12-06 PROCEDURE — 80053 COMPREHEN METABOLIC PANEL: CPT | Performed by: EMERGENCY MEDICINE

## 2024-12-06 PROCEDURE — 80053 COMPREHEN METABOLIC PANEL: CPT

## 2024-12-06 PROCEDURE — 96372 THER/PROPH/DIAG INJ SC/IM: CPT

## 2024-12-06 PROCEDURE — 96374 THER/PROPH/DIAG INJ IV PUSH: CPT

## 2024-12-06 PROCEDURE — 99285 EMERGENCY DEPT VISIT HI MDM: CPT

## 2024-12-06 PROCEDURE — 83690 ASSAY OF LIPASE: CPT | Performed by: EMERGENCY MEDICINE

## 2024-12-06 PROCEDURE — 96361 HYDRATE IV INFUSION ADD-ON: CPT

## 2024-12-06 RX ORDER — ONDANSETRON 4 MG/1
4 TABLET, ORALLY DISINTEGRATING ORAL EVERY 6 HOURS PRN
Qty: 15 TABLET | Refills: 0 | Status: SHIPPED | OUTPATIENT
Start: 2024-12-06 | End: 2024-12-13

## 2024-12-06 RX ORDER — DICYCLOMINE HYDROCHLORIDE 10 MG/ML
20 INJECTION INTRAMUSCULAR ONCE
Status: COMPLETED | OUTPATIENT
Start: 2024-12-06 | End: 2024-12-06

## 2024-12-06 RX ORDER — KETOROLAC TROMETHAMINE 15 MG/ML
15 INJECTION, SOLUTION INTRAMUSCULAR; INTRAVENOUS ONCE
Status: COMPLETED | OUTPATIENT
Start: 2024-12-06 | End: 2024-12-06

## 2024-12-06 RX ORDER — ONDANSETRON 2 MG/ML
4 INJECTION INTRAMUSCULAR; INTRAVENOUS ONCE
Status: COMPLETED | OUTPATIENT
Start: 2024-12-06 | End: 2024-12-06

## 2024-12-06 RX ORDER — DICYCLOMINE HCL 20 MG
20 TABLET ORAL 4 TIMES DAILY PRN
Qty: 30 TABLET | Refills: 0 | Status: SHIPPED | OUTPATIENT
Start: 2024-12-06 | End: 2025-01-05

## 2024-12-07 NOTE — ED PROVIDER NOTES
Patient Seen in: [facility] Emergency Department      History     Chief Complaint   Patient presents with    Nausea/Vomiting/Diarrhea     Stated Complaint: Vomiting, diarrhea    Subjective:   29-year-old female, remote history of gastric sleeve, presents with concern for bad food exposure.  Patient that she had tacos this morning, states that shortly afterwards also abdominal cramping and had episodes of nonbloody emesis.  Then had few episodes of loose stools.  Continued having nausea and vomiting some intermittent hanh cramping without any pain.  Has chills but no fever.  No urinary complaints.  Reports a miscarriage last month at 5 weeks with no complications otherwise                Objective:     History reviewed. No pertinent past medical history.           History reviewed. No pertinent surgical history.             Social History     Socioeconomic History    Marital status:    Tobacco Use    Smoking status: Never     Passive exposure: Never    Smokeless tobacco: Never   Vaping Use    Vaping status: Never Used   Substance and Sexual Activity    Alcohol use: Not Currently    Drug use: Never    Sexual activity: Yes     Partners: Male     Social Drivers of Health     Financial Resource Strain: Not at Risk (2024)    Received from [employer]    Financial Resource Strain     Financial Resource Strain: 1   Food Insecurity: Not at Risk (2024)    Received from [employer]    Food Insecurity     Food: 1   Transportation Needs: Not at Risk (2024)    Received from [employer]    Transportation Needs     Transportation: 1   Stress: Not on File (2024)    Received from [employer]    Stress     Stress: 0   Housing Stability: Not at Risk (2024)    Received from [employer]    Housing Stability     Housin                  Physical Exam     ED Triage Vitals [24 1806]   /66   Pulse 98   Resp 18   Temp 98.6 °F (37 °C)   Temp src Temporal   SpO2 97 %   O2 Device None (Room air)       Current Vitals:    Vital Signs  BP: 107/66  Pulse: 98  Resp: 18  Temp: 98.6 °F (37 °C)  Temp src: Temporal    Oxygen Therapy  SpO2: 97 %  O2 Device: None (Room air)        Physical Exam  Vitals and nursing note reviewed.   HENT:      Head: Normocephalic.   Cardiovascular:      Rate and Rhythm: Normal rate and regular rhythm.      Heart sounds: Normal heart sounds.   Abdominal:      General: Bowel sounds are normal. There is no distension.      Palpations: Abdomen is soft.      Tenderness: There is no abdominal tenderness. There is no guarding or rebound.   Skin:     General: Skin is warm and dry.   Neurological:      General: No focal deficit present.      Mental Status: She is alert.   Psychiatric:         Behavior: Behavior normal.       Normal bowel sounds.  Abdomen is soft.  No tenderness guarding or rebound noted.    ED Course     Labs Reviewed   COMP METABOLIC PANEL (14) - Abnormal; Notable for the following components:       Result Value    Glucose 100 (*)     All other components within normal limits   CBC WITH DIFFERENTIAL WITH PLATELET - Abnormal; Notable for the following components:    Neutrophil Absolute Prelim 8.41 (*)     Neutrophil Absolute 8.41 (*)     Lymphocyte Absolute 0.31 (*)     All other components within normal limits   LIPASE - Abnormal; Notable for the following components:    Lipase 61 (*)     All other components within normal limits   URINALYSIS, ROUTINE   RAINBOW DRAW LAVENDER   RAINBOW DRAW LIGHT GREEN                   MDM      External chart review demonstrates outpatient OB/GYN visit in September of this year     at bedside helpful to provide information on the history presenting illness    Differential diagnosis includes, but limited to, bowel obstruction, viral syndrome, bacterial infection, gastroenteritis, food exposure    49-year-old female with some mild aminal cramping with nausea vomiting diarrhea that started after eating tacos.  No one else had her tacos but ate similar food from a  different restaurant.  She has no fever but has some chills.  Her labs are stable.  Some minimal elevation of her lipase.  No significant epigastric tenderness.  Not a heavy drinker.  She has history of cholecystectomy.  She is feeling much better with Bentyl and Toradol, IV fluids and Zofran.  Received 2 L IV fluids.  She is tolerating Gatorade.  Has an appetite.  Further workup offered considered and discussed including CT, possible admission etc.  Says she feels much better, believes the tach if she ate and wants to go home.  Will prescribe Bentyl and Zofran ODT.  Will discharge home with bland diet precautions and strict return precaution provided after discussion of risks, benefits, alternatives.  She verbalized understanding, as it family and wishes to be discharged home at this time.  Abdomen is soft.  No rebound or guarding.  Nonsurgical abdomen given acute abdomen precautions.    Patient was screened and evaluated during this visit.  As the treating physician attending to the patient, I determined within reasonable clinical confidence and prior to discharge, that an emergency medical condition was not or was no longer present.  There was no indication for further evaluation, treatment, or admission on an emergency basis.  Comprehensive verbal and written discharge and follow-up instructions were provided to help prevent relapse or worsening.  Patient was instructed to follow-up with their primary care provider for further evaluation and treatment, return immediately to ER for worsening, concerning, new, or changing/persisting symptoms. I discussed the case with the patient and they had no questions, complaints, or concerns.  Patient was comfortable going home.     Per the discharge paperwork, patients are encouraged to and given instructions on how to sign up for [facility], where they have access to their records, including any/all incidental findings.     This note was prepared using [employer]  recognition dictation software. As a result errors may occur. When identified these errors have been corrected. While every attempt is made to correct errors during dictation discrepancies may still exist    Note to patient: The 21st Century Cures Act makes medical notes like these available to patients in the interest of transparency. However, this is a medical document intended as peer to peer communication. It is written in medical language and may contain abbreviations or verbiage that are unfamiliar. It may appear blunt or direct. Medical documents are intended to carry relevant information, facts as evident, and the clinical opinion of the practitioner.             Medical Decision Making      Disposition and Plan     Clinical Impression:  1. Gastroenteritis         Disposition:  Discharge  12/6/2024  9:23 pm    Follow-up:  [facility] Emergency Department  [address] [place] [id number]  [phone]  Follow up  As needed, If symptoms worsen          Medications Prescribed:  Current Discharge Medication List        START taking these medications    Details   ondansetron 4 MG Oral Tablet Dispersible Take 1 tablet (4 mg total) by mouth every 6 (six) hours as needed for Nausea.  Qty: 15 tablet, Refills: 0      dicyclomine 20 MG Oral Tab Take 1 tablet (20 mg total) by mouth 4 (four) times daily as needed.  Qty: 30 tablet, Refills: 0                 Supplementary Documentation:

## 2024-12-07 NOTE — ED INITIAL ASSESSMENT (HPI)
Pt in from home. Pt reports 4 episodes of vomiting and 5 episodes of diarrhea since this morning. Pt reports chills and weakness. Pt denies any cough/shortness of breath/chest pain/urinary symptoms.

## 2025-01-15 ENCOUNTER — OFFICE VISIT (OUTPATIENT)
Dept: OBGYN CLINIC | Facility: CLINIC | Age: 30
End: 2025-01-15

## 2025-01-15 ENCOUNTER — MED REC SCAN ONLY (OUTPATIENT)
Dept: FAMILY MEDICINE CLINIC | Facility: CLINIC | Age: 30
End: 2025-01-15

## 2025-01-15 VITALS
DIASTOLIC BLOOD PRESSURE: 66 MMHG | WEIGHT: 142.31 LBS | SYSTOLIC BLOOD PRESSURE: 108 MMHG | HEIGHT: 63 IN | BODY MASS INDEX: 25.21 KG/M2

## 2025-01-15 DIAGNOSIS — N96 RECURRENT PREGNANCY LOSS WITHOUT CURRENT PREGNANCY: ICD-10-CM

## 2025-01-15 DIAGNOSIS — Z12.4 ENCOUNTER FOR PAPANICOLAOU SMEAR FOR CERVICAL CANCER SCREENING: ICD-10-CM

## 2025-01-15 DIAGNOSIS — N92.6 IRREGULAR MENSES: ICD-10-CM

## 2025-01-15 DIAGNOSIS — O03.9 SAB (SPONTANEOUS ABORTION) (HCC): ICD-10-CM

## 2025-01-15 DIAGNOSIS — Z01.419 ENCOUNTER FOR WELL WOMAN EXAM WITH ROUTINE GYNECOLOGICAL EXAM: Primary | ICD-10-CM

## 2025-01-15 PROCEDURE — 99395 PREV VISIT EST AGE 18-39: CPT | Performed by: OBSTETRICS & GYNECOLOGY

## 2025-01-15 RX ORDER — MULTIVIT-MIN/IRON/FOLIC ACID/K 18-600-40
CAPSULE ORAL
COMMUNITY

## 2025-01-15 NOTE — PROGRESS NOTES
[facility]  Obstetrics and Gynecology  Gynecology Visit    Chief Complaint   Patient presents with    Annual     Reviewed Preventative/Wellness form with patient.             Abelino Rico is a 29 year old female who presents for annual.    LMP: 12/17/2024.    Menses regular: yes.    Menstrual flow normal: normal.    Birth control or HRT:  n/a.   Refill n/a  Last Pap Smear: 11/02/2023.  Any history of abnormal paps: no   Last MMG: n/a  Any Medication Refills needed today?: n/a  Sleep: 7 hours.    Diet: balanced.    Exercise: no.   Screening labs/Blood work today: n/a.     Colonoscopy (if over 44 y/o): n/a.   Gardasil:(age 9-44 y/o) no.   Genetic Cancer screen (if indicated): n/a.   Flu (Aug-April): declines.TDAP (every 10 years) declines.      Additional Problems/concerns: Pt presents for annual exam.      Next Appt: will call      There is no immunization history on file for this patient.      Current Outpatient Medications:     Cholecalciferol (VITAMIN D) 50 MCG (2000 UT) Oral Cap, Take by mouth., Disp: , Rfl:     Omega-3 Fatty Acids (OMEGA 3 500 OR), Take by mouth., Disp: , Rfl:     Prenatal MV-Min-Fe Fum-FA-DHA (PRENATAL/FOLIC ACID+DHA) 27-0.8-200 MG Oral Cap, Take 1 tablet by mouth daily., Disp: 90 capsule, Rfl: 3    progesterone 200 MG Oral Cap, Take 1 capsule (200 mg total) by mouth As Directed. (Patient not taking: Reported on 1/15/2025), Disp: 30 capsule, Rfl: 1    Blood Glucose Monitoring Suppl (BLOOD GLUCOSE MONITOR SYSTEM) w/Device Does not apply Kit, Abelino Rico (Patient not taking: Reported on 1/15/2025), Disp: 1 kit, Rfl: 0    Glucose Blood (ONETOUCH ULTRA) In Vitro Strip, Use strip to test blood glucose 4 times a day as directed. (Patient not taking: Reported on 1/15/2025), Disp: 200 strip, Rfl: 3    Microlet Lancets Does not apply Misc, Use 1 lancet 4 times daily. Please substitute with preferred meter on patient insurance formulary (Patient not taking: Reported on 1/15/2025), Disp:  200 each, Rfl: 2    aspirin (ASPIRIN 81) 81 MG Oral Tab EC, Take 1 tablet (81 mg total) by mouth in the morning and 1 tablet (81 mg total) before bedtime. (Patient not taking: Reported on 1/15/2025), Disp: 180 tablet, Rfl: 2    metroNIDAZOLE 0.75 % Vaginal Gel, Apply vaginally for 7 nights. (Patient not taking: Reported on 1/15/2025), Disp: 70 g, Rfl: 0    Allergies[1]    OB History    Para Term  AB Living   2 0 0 0 2 0   SAB IAB Ectopic Multiple Live Births   1 1 0 0 0      # Outcome Date GA Lbr Chance/2nd Weight Sex Type Anes PTL Lv   2 SAB 2024           1 IAB 24 23w0d    THERAPEUTIC Gen  FD       Gyn History   Pap Date: 23  Pap Result Notes: Normal   Period Cycle (Days): 28-30 days (1/15/2025  1:26 PM)  Period Duration (Days): 5-6 days (1/15/2025  1:26 PM)  Period Flow: Normal (1/15/2025  1:26 PM)  Use of Birth Control (if yes, specify type): None (1/15/2025  1:26 PM)  Pap Date: 23 (1/15/2025  1:26 PM)  Pap Result Notes: Normal (1/15/2025  1:26 PM)        Latest Ref Rng & Units 2023     3:48 PM   RECENT PAP RESULTS   HPV Negative Negative            History reviewed. No pertinent past medical history.    Past Surgical History:   Procedure Laterality Date    D & c  2024       History reviewed. No pertinent family history.     Tobacco  Allergies  Meds  Med Hx  Surg Hx  Fam Hx  Soc Hx        Social History     Socioeconomic History    Marital status:      Spouse name: Not on file    Number of children: Not on file    Years of education: Not on file    Highest education level: Not on file   Occupational History    Not on file   Tobacco Use    Smoking status: Never     Passive exposure: Never    Smokeless tobacco: Never   Vaping Use    Vaping status: Never Used   Substance and Sexual Activity    Alcohol use: Not Currently    Drug use: Never    Sexual activity: Yes     Partners: Male   Other Topics Concern    Not on file   Social History Narrative    Not on file      Social Drivers of Health     Financial Resource Strain: Not at Risk (2024)    Received from "[employer]    Financial Resource Strain     Financial Resource Strain: 1   Food Insecurity: Not at Risk (2024)    Received from "[facility]    Food Insecurity     Food: 1   Transportation Needs: Not at Risk (2024)    Received from "[employer]    Transportation Needs     Transportation: 1   Stress: Not on File (2024)    Received from "[employer]    Stress     Stress: 0   Housing Stability: Not at Risk (2024)    Received from "[employer]    Housing Stability     Housin       /66 (BP Location: Right arm, Patient Position: Sitting, Cuff Size: adult)   Ht 5' 3\" (1.6 m)   Wt 142 lb 4.9 oz (64.5 kg)   LMP 2024   Breastfeeding No   BMI 25.21 kg/m²     Wt Readings from Last 3 Encounters:   01/15/25 142 lb 4.9 oz (64.5 kg)   24 138 lb 14.2 oz (63 kg)   24 129 lb (58.5 kg)         Health Maintenance   Topic Date Due    Influenza Vaccine (1) 2021    Screen for Cervical Cancer 2021    DTaP,Tdap and Td Vaccines (3 - Td or Tdap) 2025    Hepatitis C Screening Completed    HIV Screening Completed    COVID-19 Vaccine Completed     Review of Systems   General: Present- Feeling well. Not Present- Chills, Fever, Weight Gain and Weight Loss.  HEENT: Not Present- Headache and Sore Throat.  Respiratory: Not Present- Cough, Difficulty Breathing, Hemoptysis and Sputum Production.  Cardiovascular: Not Present- Chest Pain, Elevated Blood Pressure, Fainting / Blacking Out and Shortness of Breath.  Gastrointestinal: Not Present- Constipation, Diarrhea, Nausea and Vomiting.  Female Genitourinary: Not Present- Discharge, Dysuria and Frequency.  Musculoskeletal: Not Present- Leg Cramps and Swelling of Extremities.  Neurological: Not Present- Dizziness and Headaches.  Psychiatric: Not Present- Anxiety and Depression.  Endocrine: Not Present- Appetite Changes, Hair Changes and Thyroid Problems.  Hematology:  Not Present- Easy Bruising and Excessive bleeding.  All other systems negative     Physical Exam The physical exam findings are as follows:     General   Mental Status - Alert. General Appearance - Cooperative. Orientation - Oriented X4. Build & Nutrition - Well nourished.    Head and Neck  Thyroid   Gland Characteristics - normal size and consistency.    Chest and Lung Exam   Inspection:   Chest Wall: - Normal.  Percussion:   Quality and Intensity: - Percussion normal.  Palpation: - Palpation normal.  Auscultation:   Breath sounds: - Normal.  Adventitious sounds: - No Adventitious sounds.    Breast   Nipples: Characteristics - Bilateral - Normal. Discharge - Bilateral - None.  Breast - Bilateral - Symmetric.    Cardiovascular   Auscultation: Rhythm - Regular. Heart Sounds - Normal heart sounds.  Murmurs & Other Heart Sounds: Auscultation of the heart reveals - No Murmurs.    Abdomen   Inspection: Inspection of the abdomen reveals - No Hernias. Incisional scars - no incisional scars.  Palpation/Percussion: Palpation and Percussion of the abdomen reveal - Non Tender and No Palpable abdominal masses.  Liver: - Normal.  Auscultation: Auscultation of the abdomen reveals - Bowel sounds normal.    Female Genitourinary     External Genitalia   Perineum - Normal. Bartholin's Gland - Bilateral - Normal. Clitoris - Normal.  Introitus: Characteristics - No Cystocele, Enterocele or Rectocele. Discharge - None.  Labia Majora: Lesions - Bilateral - None. Characteristics - Bilateral - Normal.  Labia Minora: Lesions - Bilateral - None. Characteristics - Bilateral - Normal.  Urethra: Characteristics - Normal. Discharge - None.  [place] Gland - Bilateral - Normal.  Vulva: Characteristics - Normal. Lesions - None.    Speculum & Bimanual   Vagina:   Vaginal Wall: - Normal.  Vaginal Lesions - None. Vaginal Mucosa - Normal.  Cervix: Characteristics - No Motion tenderness. Discharge - None.  Uterus: Characteristics - Normal. Position -  Midposition.  Adnexa: Characteristics - Bilateral - Normal. Masses - No Adnexal Masses.  [place]et [place]: pH - 3.8-4.2. Vaginal discharge - Clear  and Thin. Amine Odor - Absent. Main patient complaints - Discharge. Microscopy - Lactobacilli and Epithelial cells.    Rectal   Anorectal Exam: External - normal external exam.    Peripheral Vascular   Upper Extremity:   Palpation: - Pulses bilaterally normal.  Lower Extremity: Inspection - Bilateral - Inspection Normal.  Palpation: Edema - Bilateral - No edema.    Neurologic   Mental Status: - Normal.    Lymphatic  General Lymphatics   Description - Normal .       1. Encounter for well woman exam with routine gynecological exam  - ThinPrep PAP with HPV Reflex Request B; Future    2. Encounter for Papanicolaou smear for cervical cancer screening  - ThinPrep PAP with HPV Reflex Request B; Future    3. SAB (spontaneous ) ([facility])    4. Irregular menses    5. Recurrent pregnancy loss without current pregnancy  - Anticardiolipin AB, IgG/M, QN; Future  - Chromosome Analysis Peripheral Blood; Future  - Lupus Anticoagulant Comp; Future  - LH (Luteinizing Hormone); Future  - FSH; Future  - Assay, Thyroid Stim Hormone; Future  - Estradiol; Future  - Prolactin; Future  - Lipid Panel; Future  - Insulin; Future  - Glucose, Serum; Future  - Hemoglobin A1C; Future  - Dehydroepiandrosterone Sulfate; Future  - Testosterone,Total and Weakly Bound w/ SHBG; Future  - Ferritin; Future                          [1] No Known Allergies

## 2025-01-16 ENCOUNTER — LAB ENCOUNTER (OUTPATIENT)
Dept: LAB | Age: 30
End: 2025-01-16
Attending: OBSTETRICS & GYNECOLOGY
Payer: MEDICAID

## 2025-01-16 ENCOUNTER — TELEPHONE (OUTPATIENT)
Dept: OBGYN CLINIC | Facility: CLINIC | Age: 30
End: 2025-01-16

## 2025-01-16 DIAGNOSIS — N92.6 MISSED MENSES: ICD-10-CM

## 2025-01-16 DIAGNOSIS — N92.6 MISSED MENSES: Primary | ICD-10-CM

## 2025-01-16 DIAGNOSIS — N96 RECURRENT PREGNANCY LOSS WITHOUT CURRENT PREGNANCY: ICD-10-CM

## 2025-01-16 LAB — B-HCG SERPL-ACNC: 135.6 MIU/ML

## 2025-01-16 PROCEDURE — 36415 COLL VENOUS BLD VENIPUNCTURE: CPT

## 2025-01-16 PROCEDURE — 84702 CHORIONIC GONADOTROPIN TEST: CPT

## 2025-01-16 NOTE — TELEPHONE ENCOUNTER
Pt name and  verified. Pt calling to establish care.     Pt was told yesterday in office to call if she is pregnant to order HCG. Pt had a miscarriage 2-3 months ago. HCG order placed. Pt verbalized understanding.     Lmp . +hpt today.

## 2025-01-20 ENCOUNTER — LAB ENCOUNTER (OUTPATIENT)
Dept: LAB | Facility: HOSPITAL | Age: 30
End: 2025-01-20
Attending: OBSTETRICS & GYNECOLOGY
Payer: MEDICAID

## 2025-01-20 ENCOUNTER — TELEPHONE (OUTPATIENT)
Dept: OBGYN CLINIC | Facility: CLINIC | Age: 30
End: 2025-01-20

## 2025-01-20 DIAGNOSIS — N92.6 MISSED MENSES: Primary | ICD-10-CM

## 2025-01-20 DIAGNOSIS — N92.6 MISSED MENSES: ICD-10-CM

## 2025-01-20 LAB
B-HCG SERPL-ACNC: 779.3 MIU/ML
PROGEST SERPL-MCNC: 9.15 NG/ML

## 2025-01-20 PROCEDURE — 84144 ASSAY OF PROGESTERONE: CPT

## 2025-01-20 PROCEDURE — 84702 CHORIONIC GONADOTROPIN TEST: CPT

## 2025-01-20 PROCEDURE — 36415 COLL VENOUS BLD VENIPUNCTURE: CPT

## 2025-01-20 NOTE — TELEPHONE ENCOUNTER
Patient is calling regarding test results and would like to know if she is going to be prescribed progesterone or not. Seeking guidance.

## 2025-01-20 NOTE — TELEPHONE ENCOUNTER
Pt name and  verified     Pt inquiring if she should take progesterone based on results. Informed pt that we will route message to Dr. Arana so that she may review results and provide recommendations. Pt verbalized understanding.

## 2025-01-20 NOTE — TELEPHONE ENCOUNTER
Spoke to Lico Pt to have repeat HCG and progesterone lab to compare values. HCG and progesterone ordered. 1/16 .6    Left message to call back     Will send [email or web address] message to notify.

## 2025-01-20 NOTE — TELEPHONE ENCOUNTER
Pt name and  verified     Informed pt to complete HCG and progesterone lab work to see trending values. Pt verbalized understanding.

## 2025-01-21 RX ORDER — PROGESTERONE 200 MG/1
200 CAPSULE ORAL DAILY
Qty: 30 CAPSULE | Refills: 0 | Status: SHIPPED | OUTPATIENT
Start: 2025-01-21

## 2025-01-21 NOTE — TELEPHONE ENCOUNTER
Patient verified name and     Patient informed of progesterone was sent. New OB appointment scheduled . Aware of scheduling details.

## 2025-01-29 ENCOUNTER — LAB ENCOUNTER (OUTPATIENT)
Dept: LAB | Facility: HOSPITAL | Age: 30
End: 2025-01-29
Attending: NURSE PRACTITIONER
Payer: MEDICAID

## 2025-01-29 DIAGNOSIS — N92.6 MISSED MENSES: ICD-10-CM

## 2025-01-29 LAB
B-HCG SERPL-ACNC: ABNORMAL MIU/ML
PROGEST SERPL-MCNC: 11.33 NG/ML

## 2025-01-29 PROCEDURE — 84144 ASSAY OF PROGESTERONE: CPT

## 2025-01-29 PROCEDURE — 36415 COLL VENOUS BLD VENIPUNCTURE: CPT

## 2025-01-29 PROCEDURE — 84702 CHORIONIC GONADOTROPIN TEST: CPT

## 2025-02-11 ENCOUNTER — INITIAL PRENATAL (OUTPATIENT)
Dept: OBGYN CLINIC | Facility: CLINIC | Age: 30
End: 2025-02-11

## 2025-02-11 ENCOUNTER — LAB ENCOUNTER (OUTPATIENT)
Dept: LAB | Facility: HOSPITAL | Age: 30
End: 2025-02-11
Attending: OBSTETRICS & GYNECOLOGY
Payer: MEDICAID

## 2025-02-11 VITALS
WEIGHT: 145 LBS | HEART RATE: 78 BPM | SYSTOLIC BLOOD PRESSURE: 106 MMHG | BODY MASS INDEX: 26 KG/M2 | DIASTOLIC BLOOD PRESSURE: 65 MMHG

## 2025-02-11 DIAGNOSIS — Z13.1 SCREENING FOR DIABETES MELLITUS (DM): ICD-10-CM

## 2025-02-11 DIAGNOSIS — Z13.0 SCREENING, IRON DEFICIENCY ANEMIA: ICD-10-CM

## 2025-02-11 DIAGNOSIS — Z34.81 ENCOUNTER FOR SUPERVISION OF OTHER NORMAL PREGNANCY IN FIRST TRIMESTER (HCC): Primary | ICD-10-CM

## 2025-02-11 DIAGNOSIS — N92.6 MISSED MENSES: ICD-10-CM

## 2025-02-11 DIAGNOSIS — Z11.3 SCREEN FOR STD (SEXUALLY TRANSMITTED DISEASE): ICD-10-CM

## 2025-02-11 DIAGNOSIS — Z34.81 ENCOUNTER FOR SUPERVISION OF OTHER NORMAL PREGNANCY IN FIRST TRIMESTER (HCC): ICD-10-CM

## 2025-02-11 DIAGNOSIS — O36.8390 UNABLE TO HEAR FETAL HEART TONES AS REASON FOR ULTRASOUND SCAN (HCC): ICD-10-CM

## 2025-02-11 DIAGNOSIS — O26.841 UTERINE SIZE DATE DISCREPANCY PREGNANCY, FIRST TRIMESTER (HCC): ICD-10-CM

## 2025-02-11 LAB
B-HCG SERPL-ACNC: ABNORMAL MIU/ML
PROGEST SERPL-MCNC: 11.61 NG/ML
RH BLOOD TYPE: POSITIVE

## 2025-02-11 PROCEDURE — 87591 N.GONORRHOEAE DNA AMP PROB: CPT | Performed by: OBSTETRICS & GYNECOLOGY

## 2025-02-11 PROCEDURE — 81514 NFCT DS BV&VAGINITIS DNA ALG: CPT | Performed by: OBSTETRICS & GYNECOLOGY

## 2025-02-11 PROCEDURE — 86900 BLOOD TYPING SEROLOGIC ABO: CPT

## 2025-02-11 PROCEDURE — 84144 ASSAY OF PROGESTERONE: CPT

## 2025-02-11 PROCEDURE — 87491 CHLMYD TRACH DNA AMP PROBE: CPT | Performed by: OBSTETRICS & GYNECOLOGY

## 2025-02-11 PROCEDURE — 86901 BLOOD TYPING SEROLOGIC RH(D): CPT

## 2025-02-11 PROCEDURE — 84702 CHORIONIC GONADOTROPIN TEST: CPT

## 2025-02-11 PROCEDURE — 36415 COLL VENOUS BLD VENIPUNCTURE: CPT

## 2025-02-11 RX ORDER — CHOLECALCIFEROL (VITAMIN D3) 50 MCG
TABLET ORAL
COMMUNITY

## 2025-02-11 NOTE — PROGRESS NOTES
Abelino Rico is a 29 year old  female who presents for an initial OB exam.  Initial OB packet given and reviewed.    LMP: 2024.     Positive preg test date:  2025    Periods:  Every 28-30 days.    BMI: 24.61    Family Hx of Diabetes:  No    Ethnic Background: [place]    Previous pregnancy complications:  D&C at 20 weeks.    Present compliants:  none.      Diet, exercise, activity restrictions, course of care, first trimester and genetic and cystic fibrosis screening reviewed. Cord blood banking reviewed.    Location: Transabdominal  Transvaginal x    OB Data: Fetus/Gestational Sac# 1/1      Placenta Location/Grade       EGA Dates 8.0      U/S EGA 5-6      NATALIIA adq      EFW crl= N/A       Uterus wnls Y/N y      Adnexa wnls Y/N y      Fetal Position N/A       FHR N/A        BPP     Impression:Early vs non- Viable IUP.  HCG and progesterone - may represent blighted ovum   Bisi Arana MD           Allergies

## 2025-02-13 ENCOUNTER — TELEPHONE (OUTPATIENT)
Dept: OBGYN CLINIC | Facility: CLINIC | Age: 30
End: 2025-02-13

## 2025-02-13 DIAGNOSIS — O02.0 BLIGHTED OVUM (HCC): Primary | ICD-10-CM

## 2025-02-13 LAB
BV BACTERIA DNA VAG QL NAA+PROBE: POSITIVE
C GLABRATA DNA VAG QL NAA+PROBE: NEGATIVE
C KRUSEI DNA VAG QL NAA+PROBE: NEGATIVE
C TRACH DNA SPEC QL NAA+PROBE: NEGATIVE
CANDIDA DNA VAG QL NAA+PROBE: NEGATIVE
N GONORRHOEA DNA SPEC QL NAA+PROBE: NEGATIVE
T VAGINALIS DNA VAG QL NAA+PROBE: NEGATIVE

## 2025-02-13 RX ORDER — METRONIDAZOLE 500 MG/1
500 TABLET ORAL 2 TIMES DAILY
Qty: 14 TABLET | Refills: 0 | Status: SHIPPED | OUTPATIENT
Start: 2025-02-13 | End: 2025-02-20

## 2025-02-13 NOTE — TELEPHONE ENCOUNTER
----- Message from Bisi Arana sent at 2/13/2025  3:31 PM CST -----  Patient has BV - treat with metrogel pv x 7 days preferred and a women's probiotic. Make changes in soap used in shower to an unscented, undyed version. If it is recurrent BV after the 7 nights of metrogel then continue once weekly for 15 weeks to allow the probiotics to work. If declines the metogel, can order flagyl 500mg po bid x 7 days (avoid alcohol with oral treatment).     Bisi Arana MD

## 2025-02-13 NOTE — TELEPHONE ENCOUNTER
Patient verified name and     Patient informed of results and recommendations. Voiced understanding and agreed. Flagyl sent per patient request.

## 2025-02-14 ENCOUNTER — HOSPITAL ENCOUNTER (OUTPATIENT)
Dept: ULTRASOUND IMAGING | Facility: HOSPITAL | Age: 30
Discharge: HOME | End: 2025-02-14
Attending: OBSTETRICS & GYNECOLOGY
Payer: MEDICAID

## 2025-02-14 DIAGNOSIS — O02.0 BLIGHTED OVUM (HCC): ICD-10-CM

## 2025-02-14 PROCEDURE — 76801 OB US < 14 WKS SINGLE FETUS: CPT | Performed by: OBSTETRICS & GYNECOLOGY

## 2025-02-14 PROCEDURE — 76817 TRANSVAGINAL US OBSTETRIC: CPT | Performed by: OBSTETRICS & GYNECOLOGY

## 2025-02-18 ENCOUNTER — TELEPHONE (OUTPATIENT)
Dept: OBGYN CLINIC | Facility: CLINIC | Age: 30
End: 2025-02-18

## 2025-02-18 NOTE — TELEPHONE ENCOUNTER
Pt name and  verified     Informed pt we are awaiting Dr. Arana's review of US. Pt requesting review promptly as pt states it is urgent. Routing to Dr. Arana.     Impression   CONCLUSION:  There is a single intrauterine gestational sac and yolk sac.  No fetal pole or fetal heart tones appreciated.  In the setting of a positive pregnancy test with beta HCG of 42,000, findings are suspicious for fetal demise.  Ectopic pregnancy  or early IUP not entirely excluded in the appropriate clinical setting.     Recommend correlation with serial beta HCG and follow-up ultrasound as clinically indicated.     1.6 cm cystic structure in the right ovary.  this is possibly a corpus luteum.

## 2025-02-19 NOTE — TELEPHONE ENCOUNTER
Spoke to Jameson. Dr. Arana tried calling pt yesterday and left message to call back. Dr. Arana will call pt today

## 2025-02-20 RX ORDER — MISOPROSTOL 200 UG/1
200 TABLET ORAL 4 TIMES DAILY
Qty: 8 TABLET | Refills: 0 | Status: SHIPPED | OUTPATIENT
Start: 2025-02-20

## 2025-02-20 NOTE — TELEPHONE ENCOUNTER
Patient verified name and     Patient wishes to proceed with medication. Per message below call placed to Jameson.    Awaiting call back.

## 2025-02-20 NOTE — TELEPHONE ENCOUNTER
Spoke with patient about options D&C versus Cytotec risk and benefits of both reviewed with the patient.  Patient to call tomorrow on Thursday with her decision of which path she would like to pursue.  Patient aware that we will do what we can on Monday to squeeze her in for the D&C if that is what she chooses or 2 doses of 800 mcg of Cytotec given 3 hours apart with Vicodin and Motrin to help with the discomfort.  Please proceed with surgery scheduling if that is what patient desires for Monday.  Please call me or Jameson  directly if patient desires the medication and we will call those medications in to her pharmacy.    Bisi Arana MD

## 2025-02-20 NOTE — PROGRESS NOTES
Called patient and instructed her to use medication per instructions. Patient verbalized understanding.      Misoprostol. This will help decrease your cramps.   Wash your hands and lie down. Place 4 Misoprostol pills in your vagina and repeat with 4 tablets in 3 hours.  Each Misoprostol pill contains 200 micrograms.     Continue to lie down for 30 minutes after each placement.     After lying down for 30 minutes, you can move around as usual. If the tablets come out after 30 minutes, it is OK. Your body has absorbed the medication.

## 2025-02-21 ENCOUNTER — TELEPHONE (OUTPATIENT)
Dept: OBGYN CLINIC | Facility: CLINIC | Age: 30
End: 2025-02-21

## 2025-02-21 NOTE — TELEPHONE ENCOUNTER
Patient took the prescribed medication this morning. She is not experiencing any cramping or bleeding at this time. Hoping to get further guidance. Please call.

## 2025-02-21 NOTE — TELEPHONE ENCOUNTER
Pt name and  verified     Pt states she has not noticed any bleeding and has completed the cytotec dose. Pt took the last dose at 12pm. Informed pt medication may take a couple of hours to show effect. Offered pt appointment for a follow up. Pt states she prefers to wait to see if medication has caused an effect. Pt aware to call if having any concerns.

## 2025-02-25 ENCOUNTER — TELEPHONE (OUTPATIENT)
Dept: OBGYN UNIT | Facility: HOSPITAL | Age: 30
End: 2025-02-25

## 2025-03-04 ENCOUNTER — TELEPHONE (OUTPATIENT)
Dept: OBGYN CLINIC | Facility: CLINIC | Age: 30
End: 2025-03-04

## 2025-03-04 DIAGNOSIS — O03.9 SAB (SPONTANEOUS ABORTION) (HCC): Primary | ICD-10-CM

## 2025-03-04 NOTE — TELEPHONE ENCOUNTER
Patient called in states last week took the miscarriage pill.   Patient request a nurse to call for next steps.

## 2025-03-04 NOTE — TELEPHONE ENCOUNTER
Patient verified name and     Patient informed of HCG to check levels. Informed of recommendations to continue weekly until levels are back to zero. Agreed, orders placed.

## 2025-03-26 ENCOUNTER — LAB ENCOUNTER (OUTPATIENT)
Dept: LAB | Facility: HOSPITAL | Age: 30
End: 2025-03-26
Attending: OBSTETRICS & GYNECOLOGY
Payer: MEDICAID

## 2025-03-26 DIAGNOSIS — O03.9 SAB (SPONTANEOUS ABORTION) (HCC): ICD-10-CM

## 2025-03-26 DIAGNOSIS — N96 RECURRENT PREGNANCY LOSS WITHOUT CURRENT PREGNANCY: ICD-10-CM

## 2025-03-26 LAB
B-HCG SERPL-ACNC: 2.9 MIU/ML
B-HCG SERPL-ACNC: 3.4 MIU/ML
CHOLEST SERPL-MCNC: 146 MG/DL (ref ?–200)
DEPRECATED HBV CORE AB SER IA-ACNC: 44 NG/ML
DHEA-S SERPL-MCNC: 171.8 UG/DL
EST. AVERAGE GLUCOSE BLD GHB EST-MCNC: 100 MG/DL (ref 68–126)
ESTRADIOL SERPL-MCNC: 60.3 PG/ML
FASTING PATIENT GLUCOSE ANSWER: YES
FASTING PATIENT LIPID ANSWER: YES
FSH SERPL-ACNC: 6.2 MIU/ML
GLUCOSE BLD-MCNC: 93 MG/DL (ref 70–99)
HBA1C MFR BLD: 5.1 % (ref ?–5.7)
HDLC SERPL-MCNC: 46 MG/DL (ref 40–59)
INSULIN SERPL-ACNC: 4.6 MU/L (ref 3–25)
LDLC SERPL CALC-MCNC: 91 MG/DL (ref ?–100)
LH SERPL-ACNC: 4.7 MIU/ML
NONHDLC SERPL-MCNC: 100 MG/DL (ref ?–130)
PROLACTIN SERPL-MCNC: 10 NG/ML
TRIGL SERPL-MCNC: 41 MG/DL (ref 30–149)
TSI SER-ACNC: 2.27 UIU/ML (ref 0.55–4.78)
VLDLC SERPL CALC-MCNC: 7 MG/DL (ref 0–30)

## 2025-03-26 PROCEDURE — 85613 RUSSELL VIPER VENOM DILUTED: CPT

## 2025-03-26 PROCEDURE — 83002 ASSAY OF GONADOTROPIN (LH): CPT

## 2025-03-26 PROCEDURE — 82627 DEHYDROEPIANDROSTERONE: CPT

## 2025-03-26 PROCEDURE — 82670 ASSAY OF TOTAL ESTRADIOL: CPT

## 2025-03-26 PROCEDURE — 85610 PROTHROMBIN TIME: CPT

## 2025-03-26 PROCEDURE — 86147 CARDIOLIPIN ANTIBODY EA IG: CPT

## 2025-03-26 PROCEDURE — 84146 ASSAY OF PROLACTIN: CPT

## 2025-03-26 PROCEDURE — 84702 CHORIONIC GONADOTROPIN TEST: CPT

## 2025-03-26 PROCEDURE — 85705 THROMBOPLASTIN INHIBITION: CPT

## 2025-03-26 PROCEDURE — 36415 COLL VENOUS BLD VENIPUNCTURE: CPT

## 2025-03-26 PROCEDURE — 83525 ASSAY OF INSULIN: CPT

## 2025-03-26 PROCEDURE — 84410 TESTOSTERONE BIOAVAILABLE: CPT

## 2025-03-26 PROCEDURE — 84443 ASSAY THYROID STIM HORMONE: CPT

## 2025-03-26 PROCEDURE — 83036 HEMOGLOBIN GLYCOSYLATED A1C: CPT

## 2025-03-26 PROCEDURE — 82947 ASSAY GLUCOSE BLOOD QUANT: CPT

## 2025-03-26 PROCEDURE — 80061 LIPID PANEL: CPT

## 2025-03-26 PROCEDURE — 83001 ASSAY OF GONADOTROPIN (FSH): CPT

## 2025-03-26 PROCEDURE — 85732 THROMBOPLASTIN TIME PARTIAL: CPT

## 2025-03-27 LAB
CARDIOLIPIN IGG SERPL-ACNC: 1.3 GPL (ref ?–10)
CARDIOLIPIN IGM SERPL-ACNC: 5 MPL (ref ?–10)

## 2025-03-28 LAB
APTT PPP: 34.8 SECONDS (ref 23–36)
INR BLD: 1.06 (ref 0.85–1.16)
LA 3 SCREEN W REFLEX-IMP: NEGATIVE
PROTHROMBIN TIME: 13.9 SECONDS (ref 11.6–14.8)
SCREEN DRVVT: 1.06 S (ref 0–1.29)
SCREEN DRVVT: NEGATIVE S
STACLOT LA DELTA: 2.1 SECONDS (ref ?–8)

## 2025-03-30 NOTE — PROGRESS NOTES
E2 60  TSH 2.2  HCG normal     Would benefit from Day 21 progesterone and synthroid 25mcg to bring tsh down (repeat tsh in 6 weeks)    Bisi Arana MD

## 2025-03-31 DIAGNOSIS — N92.6 IRREGULAR MENSES: Primary | ICD-10-CM

## 2025-03-31 RX ORDER — LEVOTHYROXINE SODIUM 25 UG/1
25 TABLET ORAL
Qty: 90 TABLET | Refills: 1 | Status: SHIPPED | OUTPATIENT
Start: 2025-03-31

## 2025-04-01 ENCOUNTER — TELEPHONE (OUTPATIENT)
Dept: OBGYN CLINIC | Facility: CLINIC | Age: 30
End: 2025-04-01

## 2025-04-01 LAB
SEX HORM BIND GLOB: 109 NMOL/L
TESTOST % FREE+WEAK BND: 5.8 %
TESTOST FREE+WEAK BND: 2.5 NG/DL
TESTOSTERONE TOT /MS: 43.1 NG/DL

## 2025-04-01 NOTE — TELEPHONE ENCOUNTER
Pt name and  verified     Informed pt Dr. Arana recommends TSH to be <1.0 if attempting to conceive. Pt verbalized understanding.

## 2025-04-10 ENCOUNTER — TELEPHONE (OUTPATIENT)
Dept: OBGYN CLINIC | Facility: CLINIC | Age: 30
End: 2025-04-10

## 2025-04-13 ENCOUNTER — LAB ENCOUNTER (OUTPATIENT)
Dept: LAB | Facility: HOSPITAL | Age: 30
End: 2025-04-13
Attending: OBSTETRICS & GYNECOLOGY
Payer: MEDICAID

## 2025-04-13 DIAGNOSIS — N92.6 IRREGULAR MENSES: ICD-10-CM

## 2025-04-13 LAB — PROGEST SERPL-MCNC: 12.92 NG/ML

## 2025-04-13 PROCEDURE — 36415 COLL VENOUS BLD VENIPUNCTURE: CPT

## 2025-04-13 PROCEDURE — 84144 ASSAY OF PROGESTERONE: CPT

## 2025-05-14 ENCOUNTER — TELEPHONE (OUTPATIENT)
Dept: OBGYN CLINIC | Facility: CLINIC | Age: 30
End: 2025-05-14

## 2025-05-14 NOTE — TELEPHONE ENCOUNTER
Patient would like to know if our office has received the lab results for her  Guy from [facility]?

## 2025-05-15 ENCOUNTER — TELEPHONE (OUTPATIENT)
Dept: OBGYN CLINIC | Facility: CLINIC | Age: 30
End: 2025-05-15

## 2025-05-15 NOTE — TELEPHONE ENCOUNTER
Patient states that she received a letter stating that she needs to get Chromosome test done, but patient states that she had test done in July 2024. Patient wants to confirm that she needs to get test done again?

## 2025-05-15 NOTE — TELEPHONE ENCOUNTER
See also 5/15 telephone encounter. Pt had Chromosome Analysis completed 7/16/24. Pt awaiting review of her 's chromosome analysis (results attached to [employer] message). Awaiting recommendations.

## 2025-05-30 ENCOUNTER — TELEPHONE (OUTPATIENT)
Age: 30
End: 2025-05-30

## 2025-05-30 NOTE — TELEPHONE ENCOUNTER
I called pt's spouse to schedule a genetic consult. Guy advised the pt doesn't have to test bc they already have results and only needs Stephy to go over results    I explained I still have to schedule a lab and if the counselor decides it's not necessary she will have someone cancel it

## 2025-06-25 ENCOUNTER — APPOINTMENT (OUTPATIENT)
Age: 30
End: 2025-06-25
Attending: OBSTETRICS & GYNECOLOGY
Payer: MEDICAID

## 2025-06-25 ENCOUNTER — OFFICE VISIT (OUTPATIENT)
Age: 30
End: 2025-06-25
Attending: OBSTETRICS & GYNECOLOGY
Payer: MEDICAID

## 2025-06-25 DIAGNOSIS — Z82.79 FAMILY HISTORY OF AUTOSOMAL TRANSLOCATION: ICD-10-CM

## 2025-06-25 DIAGNOSIS — N96 RECURRENT PREGNANCY LOSS: Primary | ICD-10-CM

## 2025-06-25 NOTE — PROGRESS NOTES
Patient Name: Abelino Rico  YOB: 1995  Date of Visit: 2025    Reason for Referral:  Abelino and Guy were both seen together for genetic counseling due to the couple's history of recurrent pregnancy loss. Both Abelino and Guy had chromosome analysis done previously. 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     Referring Provider: Rika Arana MD    Medical History: Abelino is a pleasant 29 year old female presenting with a history of recurrent pregnancy loss. She is .    In 2024 she had a pregnancy that was progressing normally, upon detailed anatomy scan ~20 weeks bilateral renal agenesis was noted, with FGR, and pulmonary hypoplasia, the couple elected to discontinue the pregnancy. Fetal CMA was reportedly normal.     In 2024 she had another early loss.     In 2025 she had another pregnancy which also resulted in early 1st trimester loss. A single intrauterine gestational sac and yolk sac was seen on ultrasound with no fetal pole or fetal heart tones detected.    In light of the history of RPL, Keira and her  Guy both had chromosome analysis (karyotype) done (see results section below).      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 Breakage and union have occurred at the breakpoints noted above and the segments distal to these breakpoints have been exchanged.      Relevant Family History: Guy is generally healthy. He has several siblings. He reports that to his knowledge no one in his family has had RPL or fertility issues or has had a child born with a disability/congenital anomaly.    Summary:  Guy was found to carry a single  apparently balanced reciprocal translocation between the long arms of chromosomes 11 and 22. The balanced reciprocal translocation found in Guy may have occurred new in him (de piyush) or it may have been inherited from one of his parents. Chromosome analysis of Guy's blood relatives including parents, siblings, or other relatives can be considered. Guy's wife Abelino also had chromosome analysis done with normal results.      Reciprocal chromosome translocations  Reciprocal translocations result from breakage of two different chromosomes, with reciprocal exchange of the broken-off segments. Usually only two chromosomes are involved, and because the exchange is reciprocal, the total chromosome number and amount of genetic information is unchanged. Reciprocal translocations are relatively common and are found in approximately 1 in 500 individuals. The reciprocal 11;22 translocation is the most common recurring reciprocal translocation in humans.      When the translocation is balanced, meaning the amount of genetic information is normal, the person with it is called a balanced reciprocal translocation carrier. Most balanced reciprocal translocation carriers are healthy and have a normal lifespan, they often may never discover their chromosome rearrangement. A translocation can be passed down in families for many generations without anyone discovering it. Or a personal with the translocation may be the first in their family to have it. The presence of a reciprocal translocation may eventually be discovered after a person with it has experienced infertility and/or recurrent miscarriages and additional genetic evaluations are done or if there is a child born with medical concerns due to the presence of an unbalanced form of the translocation.      I emphasized to the couple that most men and women with a balanced translocation can and do have children. There are usually four possible outcomes when a couple with a  balanced reciprocal translocation conceives. These outcomes can be more likely or less likely, depending on the particular translocation.   A child with an ordinary chromosome pattern (46,XX or 46,XY)  A child who is a carrier of the same balanced translocation as the parent (t(11:22))  Failure to establish a recognized pregnancy, or apparent fertility problems due to repeated miscarriages of pregnancies with unbalanced chromosomes   An ongoing pregnancy with unbalanced chromosomes which may not continue to term or may lead to the birth of a child with physical and/or learning problems    Outcomes 1 and 2 occur as a result of alternate segregation during meiosis/gametogenesis. The fetus would have a complete chromosome complement and either have normal chromosomes or be a carrier of the balanced 11;22 translocation and would generally be expected to be healthy.      Typically, in outcomes 3 and 4 the chromosome imbalance is due to a segment of one of the participating chromosomes being duplicated and a segment of the other chromosome being deleted. This confers a partial trisomy and a concomitant partial monosomy resulting from adjacent-1 or adjacent-2 segregation pattern during meiosis/gametogenesis. For carriers of an 11;22 translocation, the genetic imbalances formed from adjacent-1 or adjacent-2 segregation are nearly universally non-viable and result in miscarriage or failure to establish a clinically recognized pregnancy. The miscarriage rate for t(11;22) carriers largely due to fetal chromosomal imbalances has been estimated to be ~23-37%, above the background risk of ~15% for the general population.     Practically, the only genetic imbalance resulting from a t(11;22) that can lead to the live birth of a phenotypically abnormal child (outcome 4) occurs as a result of 3:1 segregation of the translocation during meiosis/gametogenesis. The 3:1 segregation pattern in the setting of a t(11;22) results in a  condition called Hayder syndrome. Hayder syndrome occurs in ~2-5% of conceptions for male t(11;22) carriers. Individuals with Hayder syndrome have a supernumerary derivative chromosome 22 with partial trisomy of 11;22 (typical karyotypes are: 47,XX,+farhat(22)t(11;22)(q23;q11) in females and 47,XY,+farhat(22)t(11;22)(q23;q11) in males). Hayder syndrome is characterized by pre- and  growth deficiency, microcephaly, hypotonia, severe developmental delays, ear anomalies, preauricular tags or pits, cleft or high-arched palate, congenital heart defects, kidney abnormalities, and genital abnormalities in males.      I emphasized to the couple that each time a pregnancy is conceived, the possibilities are the same - a baby with normal chromosomes, a baby who is a carrier of the balanced translocation, or a baby with unbalanced chromosomes which may result in miscarriage (~20-40% of the time) or the birth of a child with Hayder syndrome (~2-5% of the time).      Reproductive options  Regarding options prior to conception, we discussed IVF with PGT-SR. I encouraged the couple to speak further with Dr. Arana about local fertility specialists, and I also shared the names of some local providers. For individuals who carry chromosome rearrangements such as reciprocal translocations, pre-implantation genetic diagnosis/testing (PGT) for structural rearrangement (SR) may be an option to reduce the chance of passing an unbalanced chromosomal rearrangement to children or to screen embryos for aneuploidy, thereby maximizing the chance of a successful healthy pregnancy. PGT is a procedure that combines in vitro fertilization (IVF) with genetic testing. IVF is used to create embryos, then PGT-SR is performed by testing cell(s) from each embryo for selected genetic conditions, such as unbalanced forms of the rearrangement(s). Generally, only embryos that are negative for the genetic condition(s) being screened for would be  implanted back in the mother.      Other options may include adoption and gamete donors. It is encouraged to have discussions and consider scheduling a consultation with a fertility specialist to go over reproductive options in more detail.      There are also different diagnostic tests that could be used to detect chromosome rearrangements prenatally during an ongoing pregnancy, including chorionic villus sampling (CVS) and amniocentesis. I explained that these are diagnostic tests performed during the 1st and 2nd-3rd trimesters of a pregnancy, respectively. Diagnostic prenatal genetic testing procedures carry some risks, including infection, miscarriage and/or pre-term delivery.     I also reviewed how NIPT is a non-invasive test that may be used to screen pregnancies for some aneuploidies but is not designed to screen for complex imbalances that can result from a translocation. Importantly, NIPT is not a diagnostic test. As such, no pregnancy management decisions should be made based on NIPT screening test results without diagnostic test confirmation.      We discussed how awareness of the personal/family history and options for various tests can help Yasameen and Guy make informed and timely decisions about pregnancy management/family planning. We discussed that there is no test that can rule in/out all conditions or guarantee the birth of a healthy child.     Abelino and Guy appeared to understand the information presented. I shared printed information with the couple about 11;22 reciprocal translocations and Hayder syndrome. I encouraged them to reach out if they or family members have questions. Thank you for allowing me to participate in the care of your patient; please do not hesitate to contact my office if you have any questions or concerns, [phone].      Send to: Sumit  Time spent managing patient care: 40 minutes      Stephy Dorsey MS, [facility]

## 2025-07-30 ENCOUNTER — OFFICE VISIT (OUTPATIENT)
Dept: SURGERY | Facility: CLINIC | Age: 30
End: 2025-07-30
Payer: MEDICAID

## 2025-07-30 DIAGNOSIS — R39.9 LOWER URINARY TRACT SYMPTOMS: Primary | ICD-10-CM

## 2025-07-30 LAB
BILIRUB UR QL: NEGATIVE
COLOR UR: YELLOW
GLUCOSE UR-MCNC: NORMAL MG/DL
HGB UR QL STRIP.AUTO: NEGATIVE
KETONES UR-MCNC: 20 MG/DL
LEUKOCYTE ESTERASE UR QL STRIP.AUTO: 500
NITRITE UR QL STRIP.AUTO: NEGATIVE
PH UR: 6 (ref 5–8)
PROT UR-MCNC: 20 MG/DL
SP GR UR STRIP: 1.03 (ref 1–1.03)
UROBILINOGEN UR STRIP-ACNC: NORMAL

## 2025-07-30 PROCEDURE — 99204 OFFICE O/P NEW MOD 45 MIN: CPT

## 2025-07-30 RX ORDER — OXYBUTYNIN CHLORIDE 5 MG/1
5 TABLET, EXTENDED RELEASE ORAL DAILY
Qty: 90 TABLET | Refills: 3 | Status: SHIPPED | OUTPATIENT
Start: 2025-07-30

## 2025-08-08 ENCOUNTER — TELEPHONE (OUTPATIENT)
Dept: OBGYN CLINIC | Facility: CLINIC | Age: 30
End: 2025-08-08

## 2025-08-13 ENCOUNTER — TELEPHONE (OUTPATIENT)
Dept: OBGYN CLINIC | Facility: CLINIC | Age: 30
End: 2025-08-13

## 2025-08-14 ENCOUNTER — TELEPHONE (OUTPATIENT)
Dept: OBGYN CLINIC | Facility: CLINIC | Age: 30
End: 2025-08-14

## 2025-08-28 ENCOUNTER — LAB ENCOUNTER (OUTPATIENT)
Dept: LAB | Age: 30
End: 2025-08-28
Attending: OBSTETRICS & GYNECOLOGY

## 2025-08-28 ENCOUNTER — OFFICE VISIT (OUTPATIENT)
Dept: OBGYN CLINIC | Facility: CLINIC | Age: 30
End: 2025-08-28

## 2025-08-28 VITALS
WEIGHT: 136.31 LBS | DIASTOLIC BLOOD PRESSURE: 82 MMHG | BODY MASS INDEX: 24.15 KG/M2 | SYSTOLIC BLOOD PRESSURE: 118 MMHG | HEIGHT: 63 IN

## 2025-08-28 DIAGNOSIS — N97.9 FEMALE INFERTILITY: Primary | ICD-10-CM

## 2025-08-28 DIAGNOSIS — Z13.21 ENCOUNTER FOR VITAMIN DEFICIENCY SCREENING: ICD-10-CM

## 2025-08-28 DIAGNOSIS — N97.9 FEMALE INFERTILITY: ICD-10-CM

## 2025-08-28 DIAGNOSIS — O03.9 SAB (SPONTANEOUS ABORTION) (HCC): ICD-10-CM

## 2025-08-28 DIAGNOSIS — Z13.29 SCREENING FOR THYROID DISORDER: ICD-10-CM

## 2025-08-28 LAB
B-HCG SERPL-ACNC: <2.6 MIU/ML (ref ?–4.2)
BASOPHILS # BLD AUTO: 0.01 X10(3) UL (ref 0–0.2)
BASOPHILS NFR BLD AUTO: 0.3 %
EOSINOPHIL # BLD AUTO: 0.05 X10(3) UL (ref 0–0.7)
EOSINOPHIL NFR BLD AUTO: 1.3 %
ERYTHROCYTE [DISTWIDTH] IN BLOOD BY AUTOMATED COUNT: 13.2 %
HCT VFR BLD AUTO: 36.2 % (ref 35–48)
HGB BLD-MCNC: 11.9 G/DL (ref 12–16)
IMM GRANULOCYTES # BLD AUTO: 0.01 X10(3) UL (ref 0–1)
IMM GRANULOCYTES NFR BLD: 0.3 %
LYMPHOCYTES # BLD AUTO: 1.33 X10(3) UL (ref 1–4)
LYMPHOCYTES NFR BLD AUTO: 34 %
MCH RBC QN AUTO: 27 PG (ref 26–34)
MCHC RBC AUTO-ENTMCNC: 32.9 G/DL (ref 31–37)
MCV RBC AUTO: 82.3 FL (ref 80–100)
MONOCYTES # BLD AUTO: 0.35 X10(3) UL (ref 0.1–1)
MONOCYTES NFR BLD AUTO: 9 %
NEUTROPHILS # BLD AUTO: 2.16 X10 (3) UL (ref 1.5–7.7)
NEUTROPHILS # BLD AUTO: 2.16 X10(3) UL (ref 1.5–7.7)
NEUTROPHILS NFR BLD AUTO: 55.1 %
PLATELET # BLD AUTO: 198 10(3)UL (ref 150–450)
RBC # BLD AUTO: 4.4 X10(6)UL (ref 3.8–5.3)
T3 SERPL-MCNC: 1.13 NG/ML (ref 0.6–1.81)
T4 SERPL-MCNC: 9.5 UG/DL (ref 4.5–10.9)
TSI SER-ACNC: 2.89 UIU/ML (ref 0.55–4.78)
VIT D+METAB SERPL-MCNC: 40.4 NG/ML (ref 30–100)
WBC # BLD AUTO: 3.9 X10(3) UL (ref 4–11)

## 2025-08-28 PROCEDURE — 84436 ASSAY OF TOTAL THYROXINE: CPT

## 2025-08-28 PROCEDURE — 84443 ASSAY THYROID STIM HORMONE: CPT

## 2025-08-28 PROCEDURE — 85025 COMPLETE CBC W/AUTO DIFF WBC: CPT

## 2025-08-28 PROCEDURE — 84702 CHORIONIC GONADOTROPIN TEST: CPT

## 2025-08-28 PROCEDURE — 36415 COLL VENOUS BLD VENIPUNCTURE: CPT

## 2025-08-28 PROCEDURE — 84480 ASSAY TRIIODOTHYRONINE (T3): CPT

## 2025-08-28 PROCEDURE — 87591 N.GONORRHOEAE DNA AMP PROB: CPT | Performed by: OBSTETRICS & GYNECOLOGY

## 2025-08-28 PROCEDURE — 87491 CHLMYD TRACH DNA AMP PROBE: CPT | Performed by: OBSTETRICS & GYNECOLOGY

## 2025-08-28 PROCEDURE — 82306 VITAMIN D 25 HYDROXY: CPT

## 2025-08-29 LAB
C TRACH DNA SPEC QL NAA+PROBE: NEGATIVE
N GONORRHOEA DNA SPEC QL NAA+PROBE: NEGATIVE